# Patient Record
Sex: FEMALE | Race: WHITE | Employment: UNEMPLOYED | URBAN - NONMETROPOLITAN AREA
[De-identification: names, ages, dates, MRNs, and addresses within clinical notes are randomized per-mention and may not be internally consistent; named-entity substitution may affect disease eponyms.]

---

## 2020-11-12 ENCOUNTER — APPOINTMENT (OUTPATIENT)
Dept: GENERAL RADIOLOGY | Age: 60
DRG: 918 | End: 2020-11-12
Payer: COMMERCIAL

## 2020-11-12 ENCOUNTER — HOSPITAL ENCOUNTER (INPATIENT)
Age: 60
LOS: 1 days | Discharge: PSYCHIATRIC OR SUBSTANCE USE FACILITY | DRG: 918 | End: 2020-11-13
Attending: EMERGENCY MEDICINE | Admitting: INTERNAL MEDICINE
Payer: COMMERCIAL

## 2020-11-12 PROBLEM — T50.902A DRUG OVERDOSES, INTENTIONAL SELF-HARM, INITIAL ENCOUNTER (HCC): Status: ACTIVE | Noted: 2020-11-12

## 2020-11-12 LAB
ACETAMINOPHEN LEVEL: <15 UG/ML
ACETAMINOPHEN LEVEL: <15 UG/ML
ALBUMIN SERPL-MCNC: 4.1 G/DL (ref 3.5–5.2)
ALP BLD-CCNC: 83 U/L (ref 35–104)
ALT SERPL-CCNC: 34 U/L (ref 5–33)
AMPHETAMINE SCREEN, URINE: NEGATIVE
ANION GAP SERPL CALCULATED.3IONS-SCNC: 11 MMOL/L (ref 7–19)
AST SERPL-CCNC: 44 U/L (ref 5–32)
BARBITURATE SCREEN URINE: NEGATIVE
BASE EXCESS ARTERIAL: -5.2 MMOL/L (ref -2–2)
BASOPHILS ABSOLUTE: 0.1 K/UL (ref 0–0.2)
BASOPHILS RELATIVE PERCENT: 0.8 % (ref 0–1)
BENZODIAZEPINE SCREEN, URINE: NEGATIVE
BILIRUB SERPL-MCNC: 0.6 MG/DL (ref 0.2–1.2)
BILIRUBIN URINE: NEGATIVE
BLOOD, URINE: NEGATIVE
BUN BLDV-MCNC: 6 MG/DL (ref 8–23)
CALCIUM SERPL-MCNC: 8.6 MG/DL (ref 8.8–10.2)
CANNABINOID SCREEN URINE: NEGATIVE
CARBOXYHEMOGLOBIN ARTERIAL: 2.2 % (ref 0–5)
CHLORIDE BLD-SCNC: 107 MMOL/L (ref 98–111)
CLARITY: CLEAR
CO2: 22 MMOL/L (ref 22–29)
COCAINE METABOLITE SCREEN URINE: NEGATIVE
COLOR: YELLOW
CREAT SERPL-MCNC: 0.6 MG/DL (ref 0.5–0.9)
EOSINOPHILS ABSOLUTE: 0.1 K/UL (ref 0–0.6)
EOSINOPHILS RELATIVE PERCENT: 2.3 % (ref 0–5)
ETHANOL: 176 MG/DL (ref 0–0.08)
GFR AFRICAN AMERICAN: >59
GFR NON-AFRICAN AMERICAN: >60
GLUCOSE BLD-MCNC: 101 MG/DL (ref 74–109)
GLUCOSE URINE: NEGATIVE MG/DL
HCO3 ARTERIAL: 19.1 MMOL/L (ref 22–26)
HCT VFR BLD CALC: 41.8 % (ref 37–47)
HEMOGLOBIN, ART, EXTENDED: 15.3 G/DL (ref 12–16)
HEMOGLOBIN: 14.3 G/DL (ref 12–16)
IMMATURE GRANULOCYTES #: 0 K/UL
INR BLD: 1.01 (ref 0.88–1.18)
KETONES, URINE: NEGATIVE MG/DL
LEUKOCYTE ESTERASE, URINE: NEGATIVE
LIPASE: 30 U/L (ref 13–60)
LYMPHOCYTES ABSOLUTE: 1.4 K/UL (ref 1.1–4.5)
LYMPHOCYTES RELATIVE PERCENT: 23.4 % (ref 20–40)
Lab: NORMAL
MCH RBC QN AUTO: 35.7 PG (ref 27–31)
MCHC RBC AUTO-ENTMCNC: 34.2 G/DL (ref 33–37)
MCV RBC AUTO: 104.2 FL (ref 81–99)
METHEMOGLOBIN ARTERIAL: 1.1 %
MONOCYTES ABSOLUTE: 0.8 K/UL (ref 0–0.9)
MONOCYTES RELATIVE PERCENT: 12.4 % (ref 0–10)
NEUTROPHILS ABSOLUTE: 3.7 K/UL (ref 1.5–7.5)
NEUTROPHILS RELATIVE PERCENT: 60.6 % (ref 50–65)
NITRITE, URINE: NEGATIVE
O2 CONTENT ARTERIAL: 19.3 ML/DL
O2 SAT, ARTERIAL: 90 %
O2 THERAPY: ABNORMAL
OPIATE SCREEN URINE: NEGATIVE
PCO2 ARTERIAL: 33 MMHG (ref 35–45)
PDW BLD-RTO: 13.2 % (ref 11.5–14.5)
PH ARTERIAL: 7.37 (ref 7.35–7.45)
PH UA: 5.5 (ref 5–8)
PLATELET # BLD: 237 K/UL (ref 130–400)
PMV BLD AUTO: 10.2 FL (ref 9.4–12.3)
PO2 ARTERIAL: 62 MMHG (ref 80–100)
POTASSIUM SERPL-SCNC: 3.9 MMOL/L (ref 3.5–5)
POTASSIUM, WHOLE BLOOD: 3.8
PROTEIN UA: NEGATIVE MG/DL
PROTHROMBIN TIME: 13.2 SEC (ref 12–14.6)
RBC # BLD: 4.01 M/UL (ref 4.2–5.4)
SALICYLATE, SERUM: <3 MG/DL (ref 3–10)
SARS-COV-2, NAAT: NOT DETECTED
SODIUM BLD-SCNC: 140 MMOL/L (ref 136–145)
SPECIFIC GRAVITY UA: 1 (ref 1–1.03)
TOTAL PROTEIN: 6.9 G/DL (ref 6.6–8.7)
UROBILINOGEN, URINE: 0.2 E.U./DL
WBC # BLD: 6.1 K/UL (ref 4.8–10.8)

## 2020-11-12 PROCEDURE — U0002 COVID-19 LAB TEST NON-CDC: HCPCS

## 2020-11-12 PROCEDURE — 81003 URINALYSIS AUTO W/O SCOPE: CPT

## 2020-11-12 PROCEDURE — 85025 COMPLETE CBC W/AUTO DIFF WBC: CPT

## 2020-11-12 PROCEDURE — 93005 ELECTROCARDIOGRAM TRACING: CPT

## 2020-11-12 PROCEDURE — 83690 ASSAY OF LIPASE: CPT

## 2020-11-12 PROCEDURE — 36600 WITHDRAWAL OF ARTERIAL BLOOD: CPT

## 2020-11-12 PROCEDURE — 2580000003 HC RX 258: Performed by: EMERGENCY MEDICINE

## 2020-11-12 PROCEDURE — 2000000000 HC ICU R&B

## 2020-11-12 PROCEDURE — G0480 DRUG TEST DEF 1-7 CLASSES: HCPCS

## 2020-11-12 PROCEDURE — G0378 HOSPITAL OBSERVATION PER HR: HCPCS

## 2020-11-12 PROCEDURE — 36415 COLL VENOUS BLD VENIPUNCTURE: CPT

## 2020-11-12 PROCEDURE — 85610 PROTHROMBIN TIME: CPT

## 2020-11-12 PROCEDURE — 6360000002 HC RX W HCPCS: Performed by: INTERNAL MEDICINE

## 2020-11-12 PROCEDURE — 6360000002 HC RX W HCPCS: Performed by: EMERGENCY MEDICINE

## 2020-11-12 PROCEDURE — 99283 EMERGENCY DEPT VISIT LOW MDM: CPT

## 2020-11-12 PROCEDURE — 96374 THER/PROPH/DIAG INJ IV PUSH: CPT

## 2020-11-12 PROCEDURE — 80307 DRUG TEST PRSMV CHEM ANLYZR: CPT

## 2020-11-12 PROCEDURE — 2580000003 HC RX 258: Performed by: INTERNAL MEDICINE

## 2020-11-12 PROCEDURE — 71045 X-RAY EXAM CHEST 1 VIEW: CPT

## 2020-11-12 PROCEDURE — 80053 COMPREHEN METABOLIC PANEL: CPT

## 2020-11-12 PROCEDURE — 99999 PR OFFICE/OUTPT VISIT,PROCEDURE ONLY: CPT | Performed by: EMERGENCY MEDICINE

## 2020-11-12 PROCEDURE — 82803 BLOOD GASES ANY COMBINATION: CPT

## 2020-11-12 PROCEDURE — 84132 ASSAY OF SERUM POTASSIUM: CPT

## 2020-11-12 PROCEDURE — 96372 THER/PROPH/DIAG INJ SC/IM: CPT

## 2020-11-12 PROCEDURE — 96361 HYDRATE IV INFUSION ADD-ON: CPT

## 2020-11-12 RX ORDER — ONDANSETRON 2 MG/ML
4 INJECTION INTRAMUSCULAR; INTRAVENOUS EVERY 6 HOURS PRN
Status: DISCONTINUED | OUTPATIENT
Start: 2020-11-12 | End: 2020-11-13 | Stop reason: HOSPADM

## 2020-11-12 RX ORDER — FLUOXETINE 10 MG/1
10 TABLET, FILM COATED ORAL NIGHTLY
Status: ON HOLD | COMMUNITY
End: 2020-11-16 | Stop reason: HOSPADM

## 2020-11-12 RX ORDER — THIAMINE HYDROCHLORIDE 100 MG/ML
100 INJECTION, SOLUTION INTRAMUSCULAR; INTRAVENOUS ONCE
Status: COMPLETED | OUTPATIENT
Start: 2020-11-12 | End: 2020-11-12

## 2020-11-12 RX ORDER — LORAZEPAM 1 MG/1
1 TABLET ORAL EVERY 6 HOURS PRN
Status: ON HOLD | COMMUNITY
End: 2020-11-16 | Stop reason: HOSPADM

## 2020-11-12 RX ORDER — ESTROGEN,CON/M-PROGEST ACET 0.3-1.5MG
1 TABLET ORAL NIGHTLY
COMMUNITY

## 2020-11-12 RX ORDER — PROMETHAZINE HYDROCHLORIDE 12.5 MG/1
12.5 TABLET ORAL EVERY 6 HOURS PRN
Status: DISCONTINUED | OUTPATIENT
Start: 2020-11-12 | End: 2020-11-13 | Stop reason: HOSPADM

## 2020-11-12 RX ORDER — SODIUM CHLORIDE 9 MG/ML
INJECTION, SOLUTION INTRAVENOUS CONTINUOUS
Status: DISCONTINUED | OUTPATIENT
Start: 2020-11-12 | End: 2020-11-13

## 2020-11-12 RX ORDER — SODIUM CHLORIDE, SODIUM LACTATE, POTASSIUM CHLORIDE, CALCIUM CHLORIDE 600; 310; 30; 20 MG/100ML; MG/100ML; MG/100ML; MG/100ML
1000 INJECTION, SOLUTION INTRAVENOUS ONCE
Status: COMPLETED | OUTPATIENT
Start: 2020-11-12 | End: 2020-11-13

## 2020-11-12 RX ADMIN — SODIUM CHLORIDE: 9 INJECTION, SOLUTION INTRAVENOUS at 16:26

## 2020-11-12 RX ADMIN — THIAMINE HYDROCHLORIDE 100 MG: 100 INJECTION, SOLUTION INTRAMUSCULAR; INTRAVENOUS at 13:43

## 2020-11-12 RX ADMIN — ENOXAPARIN SODIUM 40 MG: 40 INJECTION SUBCUTANEOUS at 16:25

## 2020-11-12 RX ADMIN — SODIUM CHLORIDE, POTASSIUM CHLORIDE, SODIUM LACTATE AND CALCIUM CHLORIDE 1000 ML: 600; 310; 30; 20 INJECTION, SOLUTION INTRAVENOUS at 13:43

## 2020-11-12 SDOH — HEALTH STABILITY: MENTAL HEALTH: HOW OFTEN DO YOU HAVE A DRINK CONTAINING ALCOHOL?: 4 OR MORE TIMES A WEEK

## 2020-11-12 ASSESSMENT — PAIN SCALES - GENERAL
PAINLEVEL_OUTOF10: 0

## 2020-11-12 NOTE — H&P
Hospital Medicine H&P    Patient:  Anastasio Burkitt  MRN: [id number]    Consulting Physician: Mya Zamarripa MD  Reason for Consult: Medical Management  Primacy Care Physician: No primary care provider on file. HISTORY OF PRESENT ILLNESS:   The patient is a 62 y.o. female presents after ingesting a large amount of Ambien and alcohol with the intent of harming herself. Upon my assessment, she will respond to sternal rub. BP and O2 saturation are stable. Past Medical History:        Diagnosis Date    Alcohol abuse        Past Surgical History:        Procedure Laterality Date    LEG SURGERY Left        Medications: Scheduled Meds:  Continuous Infusions:  PRN Meds:. Allergies:  Morphine and Pcn [penicillins]    Social History:   TOBACCO:   has no history on file for tobacco.  ETOH:   reports current alcohol use. Family History:   History reviewed. No pertinent family history. ROS:  Review of Systems   Unable to perform ROS: Mental status change       Physical Exam:    Vitals: BP 97/65   Pulse 81   Temp 97.8 °F (36.6 °C)   Resp 18   SpO2 98%     Physical Exam  Vitals signs and nursing note reviewed. HENT:      Head: Normocephalic and atraumatic. Right Ear: External ear normal.      Left Ear: External ear normal.      Nose: Nose normal.      Mouth/Throat:      Mouth: Mucous membranes are moist.   Eyes:      Conjunctiva/sclera: Conjunctivae normal.   Neck:      Musculoskeletal: Neck supple. No neck rigidity or muscular tenderness. Cardiovascular:      Rate and Rhythm: Normal rate and regular rhythm. Heart sounds: Normal heart sounds. Pulmonary:      Effort: Pulmonary effort is normal.      Breath sounds: Normal breath sounds. Abdominal:      General: Abdomen is flat. Palpations: Abdomen is soft. Musculoskeletal: Normal range of motion. Skin:     General: Skin is warm and dry. Neurological:      Mental Status: She is alert. Comments:  Withdraws from pain         CBC: Recent Labs     11/12/20  1239   WBC 6.1   HGB 14.3        BMP:    Recent Labs     11/12/20  1239 11/12/20  1302     --    K 3.9 3.8     --    CO2 22  --    BUN 6*  --    CREATININE 0.6  --    GLUCOSE 101  --      Hepatic:   Recent Labs     11/12/20  1239   AST 44*   ALT 34*   BILITOT 0.6   ALKPHOS 83     Troponin: No results for input(s): TROPONINI in the last 72 hours. BNP: No results for input(s): BNP in the last 72 hours. Lipids: No results for input(s): CHOL, HDL in the last 72 hours. Invalid input(s): LDLCALCU  ABGs:   Lab Results   Component Value Date    PHART 7.370 11/12/2020    PO2ART 62.0 11/12/2020    [id number] 33.0 11/12/2020     INR:   Recent Labs     11/12/20  1239   INR 1.01     -----------------------------------------------------------------  Xr Chest Portable    Result Date: 11/12/2020  Examination. XR CHEST PORTABLE 11/12/2020 11:49 AM History: Overdose. A frontal portable upright view of the chest is obtained. There is no previous study for comparison. The lungs are poorly inflated. There are areas of discoid atelectasis in the lower lung bilaterally. There is no evidence recent infiltrate, pleural effusion, pulmonary congestion or pneumothorax. Heart size is not evaluated. No acute bony abnormality. The poor lung expansion but no active cardiopulmonary disease. Signed by Dr Clement Young on 11/12/2020 1:18 PM         Assessment and Plan     Drug overdoses, intentional self-harm, initial encounter. Admit to ICU. Observe. Psych consult once medically stable. Please document 35 minutes for discussion with ER staff, patient assessment, , and documentation.       Ashley Dumont DO

## 2020-11-12 NOTE — PROGRESS NOTES
Contains abnormal data  BLOOD GAS, ARTERIAL   Status:  Final result    Ref Range & Units  11/12/20 1302   pH, Arterial  7.350 - 7.450  7.370    pCO2, Arterial  35.0 - 45.0 mmHg  33. 0Low      pO2, Arterial  80.0 - 100.0 mmHg  62. 0Low      HCO3, Arterial  22.0 - 26.0 mmol/L  19.1Low      Base Excess, Arterial  -2.0 - 2.0 mmol/L  -5.2Low      Hemoglobin, Art, Extended  12.0 - 16.0 g/dL  15.3    O2 Sat, Arterial  >92 %  90.0    Carboxyhgb, Arterial  0.0 - 5.0 %  2.2    Comment:      0.0-1.5   (Smokers 1.5-5.0)    Methemoglobin, Arterial  <1.5 %  1.1    O2 Content, Arterial  Not Established mL/dL  19.3    O2 Therapy   Unknown    Resulting Agency   [facility]         Specimen Collected: 11/12/20 1302  Last Resulted: 11/12/20 1303  View Other Order Details         Pt on room air, RR 20 site RR (choice)

## 2020-11-12 NOTE — ED NOTES
Called poison control, spoke to Floretta Hammans. Watch for CNS depression, supportive care, observe 4-6 hours.       [address] [place] [address], RN  11/12/20 [address] 15 RAMO Tanner  11/12/20 [address] 15 Keo Bettencourt  11/12/20 5061

## 2020-11-12 NOTE — ED NOTES
Spoke to pt sister, Blue Jeffery, on the phone ([phone]) Per pt sister, pt called her at [address] [place] [address] and said she was \"done, cant do life anymore, going to take pills\" and that pt chose now to do it bc sister is in the hospital and cant stop her. Per pt sister, pt also takes ativan 1mg and is unsure if pt took any today. Pt also is an alcoholic per sister.       Wesley Jay RN  11/12/20 4245

## 2020-11-12 NOTE — ED PROVIDER NOTES
History     Socioeconomic History    Marital status: Unknown     Spouse name: None    Number of children: None    Years of education: None    Highest education level: None   Occupational History    None   Social Needs    Financial resource strain: None    Food insecurity     Worry: None     Inability: None    Transportation needs     Medical: None     Non-medical: None   Tobacco Use    Smoking status: Unknown If Ever Smoked   Substance and Sexual Activity    Alcohol use: Yes    Drug use: None    Sexual activity: None   Lifestyle    Physical activity     Days per week: None     Minutes per session: None    Stress: None   Relationships    Social connections     Talks on phone: None     Gets together: None     Attends Yarsani service: None     Active member of club or organization: None     Attends meetings of clubs or organizations: None     Relationship status: None    Intimate partner violence     Fear of current or ex partner: None     Emotionally abused: None     Physically abused: None     Forced sexual activity: None   Other Topics Concern    None   Social History Narrative    None       SCREENINGS             PHYSICAL EXAM    (up to 7 for level 4, 8 or more for level 5)     ED Triage Vitals [11/12/20 1239]   BP Temp Temp src Pulse Resp SpO2 Height Weight   (!) 126/96 97.8 °F (36.6 °C) -- 111 18 98 % -- --       Physical Exam  Vitals signs and nursing note reviewed. Constitutional:       Appearance: She is ill-appearing. Comments: Patient will wake up and answer some questions. She slurs her speech she goes back to sleep. She does not appear in any distress however she is lethargic post overdose. HENT:      Head: Normocephalic and atraumatic. Eyes:      Extraocular Movements: Extraocular movements intact. Pupils: Pupils are equal, round, and reactive to light. Neck:      Musculoskeletal: Normal range of motion and neck supple.    Cardiovascular:      Rate and Rhythm: Normal rate and regular rhythm. Pulmonary:      Effort: Pulmonary effort is normal. No respiratory distress. Abdominal:      General: Abdomen is flat. Tenderness: There is no abdominal tenderness. Musculoskeletal: Normal range of motion. General: No tenderness. Skin:     General: Skin is warm and dry. Neurological:      Mental Status: She is lethargic. GCS: GCS eye subscore is 4. GCS verbal subscore is 5. GCS motor subscore is 6. Comments: Patient is very sleepy however she is able to answer questions she tells me she is tired         DIAGNOSTIC RESULTS     EKG: All EKG's are interpreted by the Emergency Department Physician who either signs or Co-signs this chart in the absence of a cardiologist.    EKG sinus tach 101 rate. QTc 450 QRS 77. Some artifact. No old EKG for comparison. No widening of the QRS. In this overdose patient. RADIOLOGY:   Non-plain film images such as CT, Ultrasound and MRI are read by the radiologist. Elio Snyder images are visualized and preliminarily interpreted by the emergency physician with the below findings:        Interpretation per the Radiologist below, if available at the time of this note:    XR CHEST PORTABLE   Final Result   The poor lung expansion but no active cardiopulmonary   disease.    Signed by Dr Olympia Lesches on 11/12/2020 1:18 PM            ED BEDSIDE ULTRASOUND:   Performed by ED Physician - none    LABS:  Labs Reviewed   COMPREHENSIVE METABOLIC PANEL - Abnormal; Notable for the following components:       Result Value    BUN 6 (*)     Calcium 8.6 (*)     ALT 34 (*)     AST 44 (*)     All other components within normal limits   CBC WITH AUTO DIFFERENTIAL - Abnormal; Notable for the following components:    RBC 4.01 (*)     .2 (*)     MCH 35.7 (*)     Monocytes % 12.4 (*)     All other components within normal limits   BLOOD GAS, ARTERIAL - Abnormal; Notable for the following components:    pCO2, Arterial 33.0 (*)     pO2, Arterial 62.0 (*)     HCO3, Arterial 19.1 (*)     Base Excess, Arterial -5.2 (*)     All other components within normal limits   LIPASE   PROTIME-INR   URINE RT REFLEX TO CULTURE   URINE DRUG SCREEN   ETHANOL   SALICYLATE LEVEL   ACETAMINOPHEN LEVEL   POTASSIUM, WHOLE BLOOD   COVID-19       All other labs were within normal range or not returned as of this dictation. EMERGENCY DEPARTMENT COURSE and DIFFERENTIALDIAGNOSIS/MDM:   Vitals:    Vitals:    11/12/20 1239 11/12/20 1304   BP: (!) 126/96    Pulse: 111    Resp: 18 16   Temp: 97.8 °F (36.6 °C)    SpO2: 98%        MDM  Number of Diagnoses or Management Options  Acute alcoholic intoxication with complication [facility]): new and requires workup  Intentional drug overdose, initial encounter [facility]): new and requires workup  Suicide attempt [facility]): new and requires workup  Diagnosis management comments: Patient with suicide attempt by polysubstance overdose including benzos and Ambien. Patient also drank alcohol. She was lethargic and somnolent on arrival.  Unclear exactly the timing of all this. The patient still is rather lethargic. She has woken up a little bit. She can talk. She does not require intubation. ABG showed sats in the low 90s. She was put on 2 L oxygen. Patient had a cath urine sent. Otherwise she will need a psychiatric evaluation medically stable. She needs to be monitored really throughout the day until tomorrow. Given her lethargy multisubstance overdose and not good history. I do think she requires ICU level care at this time for continued evaluation and monitoring. Patient discussed with Dr. Too Roger who will admit the patient. Have ordered a Covid screening test as well. The patient has no fever or Covid symptoms however.        Amount and/or Complexity of Data Reviewed  Clinical lab tests: ordered and reviewed  Tests in the radiology section of CPT®: ordered and reviewed  Obtain history from someone other than the patient: yes  Independent visualization of images, tracings, or specimens: yes    Risk of Complications, Morbidity, and/or Mortality  Presenting problems: high    Patient Progress  Patient progress: (guarded)        CONSULTS:  None    PROCEDURES:  Unless otherwise notedbelow, none     Procedures    FINAL IMPRESSION     1. Intentional drug overdose, initial encounter ([facility])    2. Acute alcoholic intoxication with complication ([facility])    3. Suicide attempt ([facility])          DISPOSITION/PLAN   DISPOSITION        PATIENT REFERRED TO:  No follow-up provider specified.     DISCHARGE MEDICATIONS:  New Prescriptions    No medications on file          (Please note that portions of this note were completed with a voice recognition program.  Efforts were made to edit the dictations butoccasionally words are mis-transcribed.)    Shannon Perez MD (electronically signed)  AttendingEmergency Physician         Shannon Perez MD  11/12/20 0301

## 2020-11-12 NOTE — ED NOTES
Bed: 01-A  Expected date: 11/12/20  Expected time:   Means of arrival:   Comments:  [address] [facility] [address] EKATERINA Alcantara, RN  11/12/20 3379

## 2020-11-13 ENCOUNTER — HOSPITAL ENCOUNTER (INPATIENT)
Age: 60
LOS: 3 days | Discharge: HOME | DRG: 885 | End: 2020-11-16
Attending: PSYCHIATRY & NEUROLOGY | Admitting: PSYCHIATRY & NEUROLOGY
Payer: COMMERCIAL

## 2020-11-13 VITALS
TEMPERATURE: 98.1 F | HEIGHT: 62 IN | SYSTOLIC BLOOD PRESSURE: 133 MMHG | DIASTOLIC BLOOD PRESSURE: 83 MMHG | BODY MASS INDEX: 29.81 KG/M2 | RESPIRATION RATE: 19 BRPM | OXYGEN SATURATION: 96 % | HEART RATE: 92 BPM | WEIGHT: 162 LBS

## 2020-11-13 PROBLEM — T50.902A DRUG OVERDOSES, INTENTIONAL SELF-HARM, INITIAL ENCOUNTER (HCC): Status: RESOLVED | Noted: 2020-11-12 | Resolved: 2020-11-13

## 2020-11-13 LAB
ALBUMIN SERPL-MCNC: 3.8 G/DL (ref 3.5–5.2)
ALP BLD-CCNC: 75 U/L (ref 35–104)
ALT SERPL-CCNC: 34 U/L (ref 5–33)
ANION GAP SERPL CALCULATED.3IONS-SCNC: 9 MMOL/L (ref 7–19)
AST SERPL-CCNC: 52 U/L (ref 5–32)
BASOPHILS ABSOLUTE: 0.1 K/UL (ref 0–0.2)
BASOPHILS RELATIVE PERCENT: 1.4 % (ref 0–1)
BILIRUB SERPL-MCNC: 0.7 MG/DL (ref 0.2–1.2)
BUN BLDV-MCNC: 7 MG/DL (ref 8–23)
CALCIUM SERPL-MCNC: 8.3 MG/DL (ref 8.8–10.2)
CHLORIDE BLD-SCNC: 113 MMOL/L (ref 98–111)
CO2: 23 MMOL/L (ref 22–29)
CREAT SERPL-MCNC: 0.5 MG/DL (ref 0.5–0.9)
EOSINOPHILS ABSOLUTE: 0.2 K/UL (ref 0–0.6)
EOSINOPHILS RELATIVE PERCENT: 3.2 % (ref 0–5)
GFR AFRICAN AMERICAN: >59
GFR NON-AFRICAN AMERICAN: >60
GLUCOSE BLD-MCNC: 85 MG/DL (ref 74–109)
HCT VFR BLD CALC: 39.1 % (ref 37–47)
HEMOGLOBIN: 13.5 G/DL (ref 12–16)
IMMATURE GRANULOCYTES #: 0 K/UL
LYMPHOCYTES ABSOLUTE: 1.3 K/UL (ref 1.1–4.5)
LYMPHOCYTES RELATIVE PERCENT: 25 % (ref 20–40)
MCH RBC QN AUTO: 35.9 PG (ref 27–31)
MCHC RBC AUTO-ENTMCNC: 34.5 G/DL (ref 33–37)
MCV RBC AUTO: 104 FL (ref 81–99)
MONOCYTES ABSOLUTE: 0.7 K/UL (ref 0–0.9)
MONOCYTES RELATIVE PERCENT: 12.9 % (ref 0–10)
NEUTROPHILS ABSOLUTE: 2.9 K/UL (ref 1.5–7.5)
NEUTROPHILS RELATIVE PERCENT: 57.3 % (ref 50–65)
PDW BLD-RTO: 13.4 % (ref 11.5–14.5)
PLATELET # BLD: 198 K/UL (ref 130–400)
PMV BLD AUTO: 9.8 FL (ref 9.4–12.3)
POTASSIUM REFLEX MAGNESIUM: 3.7 MMOL/L (ref 3.5–5)
RBC # BLD: 3.76 M/UL (ref 4.2–5.4)
SODIUM BLD-SCNC: 145 MMOL/L (ref 136–145)
TOTAL PROTEIN: 6 G/DL (ref 6.6–8.7)
WBC # BLD: 5.1 K/UL (ref 4.8–10.8)

## 2020-11-13 PROCEDURE — 1240000000 HC EMOTIONAL WELLNESS R&B

## 2020-11-13 PROCEDURE — G0378 HOSPITAL OBSERVATION PER HR: HCPCS

## 2020-11-13 PROCEDURE — 99221 1ST HOSP IP/OBS SF/LOW 40: CPT | Performed by: PSYCHIATRY & NEUROLOGY

## 2020-11-13 PROCEDURE — 36415 COLL VENOUS BLD VENIPUNCTURE: CPT

## 2020-11-13 PROCEDURE — 85025 COMPLETE CBC W/AUTO DIFF WBC: CPT

## 2020-11-13 PROCEDURE — 6370000000 HC RX 637 (ALT 250 FOR IP): Performed by: PSYCHIATRY & NEUROLOGY

## 2020-11-13 PROCEDURE — 80053 COMPREHEN METABOLIC PANEL: CPT

## 2020-11-13 RX ORDER — HYDROXYZINE HYDROCHLORIDE 25 MG/1
25 TABLET, FILM COATED ORAL 3 TIMES DAILY PRN
Status: DISCONTINUED | OUTPATIENT
Start: 2020-11-13 | End: 2020-11-16 | Stop reason: HOSPADM

## 2020-11-13 RX ORDER — TRAZODONE HYDROCHLORIDE 50 MG/1
25 TABLET ORAL NIGHTLY
Status: DISCONTINUED | OUTPATIENT
Start: 2020-11-13 | End: 2020-11-16 | Stop reason: HOSPADM

## 2020-11-13 RX ORDER — POLYETHYLENE GLYCOL 3350 17 G/17G
17 POWDER, FOR SOLUTION ORAL DAILY PRN
Status: DISCONTINUED | OUTPATIENT
Start: 2020-11-13 | End: 2020-11-16 | Stop reason: HOSPADM

## 2020-11-13 RX ORDER — ACETAMINOPHEN 325 MG/1
650 TABLET ORAL EVERY 4 HOURS PRN
Status: DISCONTINUED | OUTPATIENT
Start: 2020-11-13 | End: 2020-11-16 | Stop reason: HOSPADM

## 2020-11-13 RX ORDER — ONDANSETRON 2 MG/ML
4 INJECTION INTRAMUSCULAR; INTRAVENOUS EVERY 6 HOURS PRN
Status: CANCELLED | OUTPATIENT
Start: 2020-11-13

## 2020-11-13 RX ORDER — LANOLIN ALCOHOL/MO/W.PET/CERES
3 CREAM (GRAM) TOPICAL NIGHTLY PRN
Status: DISCONTINUED | OUTPATIENT
Start: 2020-11-13 | End: 2020-11-16 | Stop reason: HOSPADM

## 2020-11-13 RX ORDER — PROMETHAZINE HYDROCHLORIDE 12.5 MG/1
12.5 TABLET ORAL EVERY 6 HOURS PRN
Status: CANCELLED | OUTPATIENT
Start: 2020-11-13

## 2020-11-13 RX ADMIN — Medication 3 MG: at 20:40

## 2020-11-13 RX ADMIN — TRAZODONE HYDROCHLORIDE 25 MG: 50 TABLET ORAL at 20:40

## 2020-11-13 RX ADMIN — HYDROXYZINE HYDROCHLORIDE 25 MG: 25 TABLET, FILM COATED ORAL at 20:40

## 2020-11-13 ASSESSMENT — PAIN SCALES - GENERAL
PAINLEVEL_OUTOF10: 0

## 2020-11-13 ASSESSMENT — ENCOUNTER SYMPTOMS
DIARRHEA: 0
VOMITING: 0
GASTROINTESTINAL NEGATIVE: 1
RHINORRHEA: 0
CONSTIPATION: 0
VOICE CHANGE: 0
NAUSEA: 0
RESPIRATORY NEGATIVE: 1
COUGH: 0
COLOR CHANGE: 0
SHORTNESS OF BREATH: 0
BACK PAIN: 0

## 2020-11-13 ASSESSMENT — PATIENT HEALTH QUESTIONNAIRE - PHQ9: SUM OF ALL RESPONSES TO PHQ QUESTIONS 1-9: 9

## 2020-11-13 ASSESSMENT — LIFESTYLE VARIABLES: HISTORY_ALCOHOL_USE: YES

## 2020-11-13 NOTE — DISCHARGE SUMMARY
Discharge Summary    Katharina Hamilton  :  1960  MRN:  [id number]    Admit date:  2020  Discharge date: 2020     Admitting Physician:  Bari Quiroga DO    Advance Directive: Full Code    Consults: psychiatry    Primary Care Physician:  No primary care provider on file. Discharge Diagnoses: Active Problems:    * No active hospital problems. *  Resolved Problems:    Drug overdoses, intentional self-harm, initial encounter [facility])      Significant Diagnostic Studies:   Xr Chest Portable    Result Date: 2020  Examination. XR CHEST PORTABLE 2020 11:49 AM History: Overdose. A frontal portable upright view of the chest is obtained. There is no previous study for comparison. The lungs are poorly inflated. There are areas of discoid atelectasis in the lower lung bilaterally. There is no evidence recent infiltrate, pleural effusion, pulmonary congestion or pneumothorax. Heart size is not evaluated. No acute bony abnormality. The poor lung expansion but no active cardiopulmonary disease. Signed by Dr Tyrese Chapman on 2020 1:18 PM      CBC:   Recent Labs     20  1239 20  0404   WBC 6.1 5.1   HGB 14.3 13.5    198     BMP:    Recent Labs     20  1239 20  1302 20  0404     --  145   K 3.9 3.8 3.7     --  113*   CO2 22  --  23   BUN 6*  --  7*   CREATININE 0.6  --  0.5   GLUCOSE 101  --  85     INR:   Recent Labs     20  1239   INR 1.01     Lipids: No results for input(s): CHOL, HDL in the last 72 hours. Invalid input(s): LDLCALCU  ABGs:  Recent Labs     20  1302   PHART 7.370   BCX6LFM 33.0*   PO2ART 62.0*   [id number] 19.1*   BEART -5.2*   HGBAE 15.3   [id number] 90.0   CARBOXHGBART 2.2   02THERAPY Unknown     HgBA1c:  No results for input(s): LABA1C in the last 72 hours. Procedures: None  Hospital Course: Ms. Marylene Setting was admitted on  after polysubstance overdose. She was monitored in the ICU.   She did have suicidal intentions. She improved clinically. Consultation was obtained with psychiatry on 11/13 who felt she would benefit from from inpatient psychiatric therapy. She will be transferred this afternoon. Physical Exam:  Vital Signs: /83   Pulse 92   Temp 98.1 °F (36.7 °C) (Temporal)   Resp 19   Ht 5' 2\" (1.575 m)   Wt 162 lb (73.5 kg)   SpO2 96%   BMI 29.63 kg/m²   General appearance:. Alert and Cooperative   HEENT: Normocephalic. Chest: clear to auscultation bilaterally without wheezes or rhonchi. Cardiac: Normal heart tones with regular rate and rhythm, S1, S2 normal. No murmurs, gallops, or rubs auscultated. Abdomen:soft, non-tender; normal bowel sounds, no masses, no organomegaly. Extremities: No clubbing or cyanosis. No peripheral edema. Peripheral pulses palpable. Neurologic: Grossly intact. Discharge Medications:       Alannah Navarro   Home Medication Instructions FDV:[id number]    Printed on:11/13/20 6075   Medication Information                      estrogen, conjugated,-medroxyPROGESTERone (PREMPRO) 0.3-1.5 MG per tablet  Take 1 tablet by mouth nightly             FLUoxetine (PROZAC) 10 MG tablet  Take 10 mg by mouth nightly             LORazepam (ATIVAN) 1 MG tablet  Take 1 mg by mouth every 6 hours as needed for Anxiety. Discharge Instructions: Follow up with PCP in 3-5 days. Take medications as directed. Resume activity as tolerated. Diet: DIET GENERAL; Safety Tray; Safety Tray (Disposables)     Disposition: Patient is medically stable and will be transferred to inpatient psychiatry. Time spent on discharge greater than 30 minutes.     Signed:  Aline Grijalva DO

## 2020-11-13 NOTE — H&P
file     Relationship status: Not on file    Intimate partner violence     Fear of current or ex partner: Not on file     Emotionally abused: Not on file     Physically abused: Not on file     Forced sexual activity: Not on file   Other Topics Concern    Not on file   Social History Narrative    Not on file       Prior to Admission medications    Medication Sig Start Date End Date Taking? Authorizing Provider   LORazepam (ATIVAN) 1 MG tablet Take 1 mg by mouth every 6 hours as needed for Anxiety. Historical Provider, MD   estrogen, conjugated,-medroxyPROGESTERone (PREMPRO) 0.3-1.5 MG per tablet Take 1 tablet by mouth nightly    Historical Provider, MD   FLUoxetine (PROZAC) 10 MG tablet Take 10 mg by mouth nightly    Historical Provider, MD         Current Facility-Administered Medications:     acetaminophen (TYLENOL) tablet 650 mg, 650 mg, Oral, Q4H PRN, Bob Murillo MD    polyethylene glycol (GLYCOLAX) packet 17 g, 17 g, Oral, Daily PRN, Bob Murillo MD    traZODone (DESYREL) tablet 25 mg, 25 mg, Oral, Nightly, Bob Murillo MD    hydrOXYzine (ATARAX) tablet 25 mg, 25 mg, Oral, TID PRN, Bob Murillo MD    melatonin tablet 3 mg, 3 mg, Oral, Nightly PRN, Bob Murillo MD    Morphine and Pcn [penicillins]    REVIEW OF SYSTEMS:    Review of Systems   Constitutional: Negative. HENT: Negative. Respiratory: Negative. Cardiovascular: Negative. Gastrointestinal: Negative. Genitourinary: Negative. Musculoskeletal: Negative. Skin: Negative. Neurological: Negative. Psychiatric/Behavioral: Positive for self-injury. Depression       PHYSICAL EXAM:    BP (!) 154/97   Pulse 94   Temp 97.6 °F (36.4 °C) (Temporal)   Resp 18   Ht 5' 3\" (1.6 m)   Wt 161 lb 2 oz (73.1 kg)   SpO2 94%   BMI 28.54 kg/m²     Physical Exam  Vitals signs reviewed. Constitutional:       Appearance: Normal appearance. HENT:      Head: Normocephalic and atraumatic.       Mouth/Throat: Mouth: Mucous membranes are moist.      Pharynx: Oropharynx is clear. Eyes:      Extraocular Movements: Extraocular movements intact. Conjunctiva/sclera: Conjunctivae normal.      Pupils: Pupils are equal, round, and reactive to light. Neck:      Musculoskeletal: Normal range of motion and neck supple. Cardiovascular:      Rate and Rhythm: Normal rate and regular rhythm. Pulses: Normal pulses. Heart sounds: Normal heart sounds. Pulmonary:      Effort: Pulmonary effort is normal. No respiratory distress. Breath sounds: Normal breath sounds. Abdominal:      General: Abdomen is flat. Bowel sounds are normal.      Palpations: Abdomen is soft. Musculoskeletal: Normal range of motion. Skin:     General: Skin is warm and dry. Capillary Refill: Capillary refill takes less than 2 seconds. Neurological:      General: No focal deficit present. Mental Status: She is alert and oriented to person, place, and time. Cranial Nerves: No cranial nerve deficit. Psychiatric:         Mood and Affect: Mood normal.         Behavior: Behavior normal.         Thought Content:  Thought content normal.         Judgment: Judgment normal.         Recent Results (from the past 24 hour(s))   CBC auto differential    Collection Time: 11/13/20  4:04 AM   Result Value Ref Range    WBC 5.1 4.8 - 10.8 K/uL    RBC 3.76 (L) 4.20 - 5.40 M/uL    Hemoglobin 13.5 12.0 - 16.0 g/dL    Hematocrit 39.1 37.0 - 47.0 %    .0 (H) 81.0 - 99.0 fL    MCH 35.9 (H) 27.0 - 31.0 pg    MCHC 34.5 33.0 - 37.0 g/dL    RDW 13.4 11.5 - 14.5 %    Platelets 074 892 - 656 K/uL    MPV 9.8 9.4 - 12.3 fL    Neutrophils % 57.3 50.0 - 65.0 %    Lymphocytes % 25.0 20.0 - 40.0 %    Monocytes % 12.9 (H) 0.0 - 10.0 %    Eosinophils % 3.2 0.0 - 5.0 %    Basophils % 1.4 (H) 0.0 - 1.0 %    Neutrophils Absolute 2.9 1.5 - 7.5 K/uL    Immature Granulocytes # 0.0 K/uL    Lymphocytes Absolute 1.3 1.1 - 4.5 K/uL    Monocytes Absolute 0.70 0.00 - 0.90 K/uL    Eosinophils Absolute 0.20 0.00 - 0.60 K/uL    Basophils Absolute 0.10 0.00 - 0.20 K/uL   Comprehensive Metabolic Panel w/ Reflex to MG    Collection Time: 11/13/20  4:04 AM   Result Value Ref Range    Sodium 145 136 - 145 mmol/L    Potassium reflex Magnesium 3.7 3.5 - 5.0 mmol/L    Chloride 113 (H) 98 - 111 mmol/L    CO2 23 22 - 29 mmol/L    Anion Gap 9 7 - 19 mmol/L    Glucose 85 74 - 109 mg/dL    BUN 7 (L) 8 - 23 mg/dL    CREATININE 0.5 0.5 - 0.9 mg/dL    GFR Non-African American >60 >60    GFR African American >59 >59    Calcium 8.3 (L) 8.8 - 10.2 mg/dL    Total Protein 6.0 (L) 6.6 - 8.7 g/dL    Alb 3.8 3.5 - 5.2 g/dL    Total Bilirubin 0.7 0.2 - 1.2 mg/dL    Alkaline Phosphatase 75 35 - 104 U/L    ALT 34 (H) 5 - 33 U/L    AST 52 (H) 5 - 32 U/L         ASSESSMENT:  1. Major depressive disorder with suicide attempt    PLAN:    1. She is admitted to [facility]. Lab results and home meds reviewed. VSS. Continue plan of care per psych. Alter treatment plan as needed.         Maki Rodriguez, AARON,   11/13/2020

## 2020-11-13 NOTE — CONSULTS
[facility]  Psychiatry Consult    Reason for Consult: overdose    56-year-old white female with history of depression, alcohol use, no previous suicide attempts or psychiatric hospitalizations, admitted to ICU s/p an overdose on Ambien and alcohol. UDS negative, . Patient has been medically cleared for transfer to psychiatry. Patient is observed eating lunch this afternoon. She is calm and cooperative. She denies withdrawal symptoms. She becomes tearful during the interview. She admits to a suicide attempt. States she recently went through a number of stressors, including losing her job as a , having to sell her house in [facility], coming to [place]" to help her sister move. Patient's mother has history of Alzheimer's dementia and is doing poorly. Her sister had an MVA and is currently at the hospital.  Patient has 2 teenage children who are currently with her ex- in [facility]. Patient states she has been overwhelmed and depressed. She is not sleeping. She denies mood swings and racing thoughts. She denies auditory and visual hallucinations. She denies paranoia. States she has never had suicidal thoughts until recently. Currently, however, appears hopeless. She believes that she will benefit from inpatient psychiatric treatment. Psychiatric History:    Diagnoses: Depression - started after father    Suicide attempts/gestures: Denies   Prior hospitalizations: Denies   Medication trials: Prozac, Luvox  Mental health contact: Lost to follow-up   Head trauma: Denies    Substance Use History:  Drinks alcohol - did not specify amount/frequesncy. Denies withdrawal seizures. Denies illicit drug use. Denies tobacco use.     Allergies:  Morphine and Pcn [penicillins]    Medical History:  Past Medical History:   Diagnosis Date    Alcohol abuse     Asthma     Depression        Family History:  Family History   Problem Relation Age of Onset    Alzheimer's Disease Mother      Sister with h/o depression and suicide attempts. Social History:  Patient is  and has 2 teenage children who are currently in [place] with her ex-. Patient has a house in [place].  She is currently in [place] helping her sister move. Recently lost her job as a . Review of Systems: 14 point review of systems negative except as described above    Vitals:    11/13/20 1200   BP: 131/78   Pulse: 76   Resp: 17   Temp: 98.1 °F (36.7 °C)   SpO2: 94%       Mental Status  Appearance: Appropriately groomed and in hospital attire. Made good eye contact. Behavior: Calm, cooperative, and socially appropriate. Speech: Normal in tone, volume, and quality. No slurring, dysarthria or pressured speech noted. Mood: \"Depressed\"   Affect: Labile  Thought Process: Appears linear  Thought Content: Denies active suicidal and homicidal ideation. No overt delusions or paranoia appreciated. Perceptions: Denies auditory or visual hallucinations at present time. Not responding to internal stimuli. Concentration: Intact. Orientation: to person, place, date, and situation. Language: Intact. Fund of information: Intact. Memory: Recent and remote appear intact. Impulsivity: Questionable. Neurovegitative: Poor appetite and sleep. Insight: Limited. Judgment: Limited. Assessment  DSM-5 DIAGNOSIS:  Impression   Major depressive disorder, recurrent, severe, without psychotic features  Suicide attempt  Alcohol use disorder  Transaminitis    Patient presents with affective instability and hopelessness. She is meeting the criteria for inpatient psychiatric treatment. Please transfer to psychiatry today. Thank you for consulting our service. Please call us with questions.       Anisha Nash MD

## 2020-11-13 NOTE — PROGRESS NOTES
[facility] Admission From ED  Nursing Admission Note              Patient Active Problem List   Diagnosis   (none) - all problems resolved or deleted       Pt admitted from 's care in ICU to [address] room 0619/619-01. Arrived on unit via [facility] with staff escort. Pt appropriately attired in paper scrubs. Body assessment completed by RN with no contraband discovered. All tubes, lines, and drains were appropriately discontinued by ED staff prior to pt transfer to [facility]. Pt belongings and valuables inventoried and cataloged, stored per policy. Pt oriented to surroundings, program expectations, and copy pt rights given. Received admit packet: [address], Visitation Info, Fall Prevention, Restraints Info. Consents reviewed, signed Pt Rights, Handbook Acceptance, Visit/Call Acceptance, PHI Release, Social Info Release, and Treatment Agreement. Pt verbalizes understanding. Pt is a smoker? no Pt offered Nicotine patch yes,   Pt refused Nicotine patch? yes,  Patient provided education on Covid-19 and the importance of reporting any respiratory symptoms, headache, body aches or cough to the nursing staff as well as  frequent hand washing, social distancing and wearing a mask while on the unit? yes,  patient provided mask? yes,  patient refused mask? no Patient verbalized understanding?  Yes.

## 2020-11-13 NOTE — PROGRESS NOTES
Hospitalist Progress Note    Patient:  Gregory Mehta  YOB: 1960  Date of Service: 11/13/2020  MRN: [id number]   Acct: [de-identified]   Primary Care Physician: No primary care provider on file. Advance Directive: Full Code  Admit Date: 11/12/2020       Hospital Day: 1  Referring Provider: Javier Palmer DO    Patient Seen, Chart, Consults, Notes, Labs, Radiology studies reviewed. Subjective:  Gregory Mehta is a 62 y.o. female  whom we are following for polysubstance overdose. She is now awake and conversant. I think she is somewhat delusional.  She tells me that she was the previous  at [employer] and at some other large corporation. She is telling me about large dollar trust funds for her children. She is tearful throughout the interview. Hemodynamics are stable. We will transfer out of ICU and ask psychiatry to see her.     Allergies:  Morphine and Pcn [penicillins]    Medicines:  Current Facility-Administered Medications   Medication Dose Route Frequency Provider Last Rate Last Dose    promethazine (PHENERGAN) tablet 12.5 mg  12.5 mg Oral Q6H PRN Javier Garre, DO        Or    [facility]) injection 4 mg  4 mg Intravenous Q6H PRN Javier Garre, DO        enoxaparin (LOVENOX) injection 40 mg  40 mg Subcutaneous Q24H Javier Garre, DO   40 mg at 11/12/20 1625       Past Medical History:  Past Medical History:   Diagnosis Date    Alcohol abuse     Asthma     Depression        Past Surgical History:  Past Surgical History:   Procedure Laterality Date    LEG SURGERY Left     tibia nailing       Family History  Family History   Problem Relation Age of Onset    Alzheimer's Disease Mother        Social History  Social History     Socioeconomic History    Marital status:      Spouse name: Not on file    Number of children: Not on file    Years of education: Not on file    Highest education level: Not on file   Occupational History dysphoric mood and suicidal ideas. Objective:  Blood pressure (!) 140/84, pulse 102, temperature 96.9 °F (36.1 °C), temperature source Temporal, resp. rate 24, height 5' 2\" (1.575 m), weight 162 lb (73.5 kg), SpO2 94 %. Intake/Output Summary (Last 24 hours) at 11/13/2020 0745  Last data filed at 11/13/2020 0600  Gross per 24 hour   Intake 1017.5 ml   Output 2100 ml   Net -1082.5 ml       Physical Exam  Vitals signs and nursing note reviewed. HENT:      Head: Normocephalic and atraumatic. Right Ear: External ear normal.      Left Ear: External ear normal.      Nose: Nose normal.      Mouth/Throat:      Mouth: Mucous membranes are moist.   Eyes:      Conjunctiva/sclera: Conjunctivae normal.      Pupils: Pupils are equal, round, and reactive to light. Neck:      Musculoskeletal: Neck supple. No neck rigidity or muscular tenderness. Cardiovascular:      Rate and Rhythm: Normal rate and regular rhythm. Heart sounds: Normal heart sounds. Pulmonary:      Effort: Pulmonary effort is normal.      Breath sounds: Normal breath sounds. Abdominal:      General: Abdomen is flat. Palpations: Abdomen is soft. Musculoskeletal: Normal range of motion. Skin:     General: Skin is warm and dry. Neurological:      Mental Status: She is alert and oriented to person, place, and time. Labs:  BMP:   Recent Labs     11/12/20  1239 11/12/20  1302 11/13/20  0404     --  145   K 3.9 3.8 3.7     --  113*   CO2 22  --  23   BUN 6*  --  7*   CREATININE 0.6  --  0.5   CALCIUM 8.6*  --  8.3*     CBC:   Recent Labs     11/12/20  1239 11/13/20  0404   WBC 6.1 5.1   HGB 14.3 13.5   HCT 41.8 39.1   .2* 104.0*    198     LIVER PROFILE:   Recent Labs     11/12/20  1239 11/13/20  0404   AST 44* 52*   ALT 34* 34*   LIPASE 30  --    BILITOT 0.6 0.7   ALKPHOS 83 75     PT/INR:   Recent Labs     11/12/20  1239   PROTIME 13.2   INR 1.01     APTT: No results for input(s):  APTT in the last 72 hours. BNP:  No results for input(s): BNP in the last 72 hours. Ionized Calcium:No results for input(s): IONCA in the last 72 hours. Magnesium:No results for input(s): MG in the last 72 hours. Phosphorus:No results for input(s): PHOS in the last 72 hours. HgbA1C: No results for input(s): LABA1C in the last 72 hours. Hepatic:   Recent Labs     11/12/20  1239 11/13/20  0404   ALKPHOS 83 75   ALT 34* 34*   AST 44* 52*   PROT 6.9 6.0*   BILITOT 0.6 0.7   LABALBU 4.1 3.8     Lactic Acid: No results for input(s): LACTA in the last 72 hours. Troponin: No results for input(s): CKTOTAL, CKMB, TROPONINT in the last 72 hours. ABGs: No results for input(s): PH, PCO2, PO2, HCO3, O2SAT in the last 72 hours. CRP:  No results for input(s): CRP in the last 72 hours. Sed Rate:  No results for input(s): SEDRATE in the last 72 hours. Cultures:   No results for input(s): CULTURE in the last 72 hours. No results for input(s): BC, Aundria Puri in the last 72 hours. No results for input(s): CXSURG in the last 72 hours. Radiology reports as per the Radiologist  Radiology: Xr Chest Portable    Result Date: 11/12/2020  Examination. XR CHEST PORTABLE 11/12/2020 11:49 AM History: Overdose. A frontal portable upright view of the chest is obtained. There is no previous study for comparison. The lungs are poorly inflated. There are areas of discoid atelectasis in the lower lung bilaterally. There is no evidence recent infiltrate, pleural effusion, pulmonary congestion or pneumothorax. Heart size is not evaluated. No acute bony abnormality. The poor lung expansion but no active cardiopulmonary disease. Signed by Dr Desire Arrieta on 11/12/2020 1:18 PM       Assessment     Drug overdose, intentional self-harm, initial encounter. Transfer out of ICU.   Ask psychiatry to evaluate.       Kb Kidd DO

## 2020-11-13 NOTE — PLAN OF CARE
Problem: Falls - Risk of:  Goal: Will remain free from falls  Description: Will remain free from falls  Outcome: Ongoing  Goal: Absence of physical injury  Description: Absence of physical injury  Outcome: Ongoing     Problem: Discharge Planning:  Goal: Discharged to appropriate level of care  Description: Discharged to appropriate level of care  Outcome: Ongoing     Problem: Fluid Volume - Deficit:  Goal: Absence of fluid volume deficit signs and symptoms  Description: Absence of fluid volume deficit signs and symptoms  Outcome: Ongoing     Problem: Nutrition Deficit:  Goal: Ability to achieve adequate nutritional intake will improve  Description: Ability to achieve adequate nutritional intake will improve  Outcome: Ongoing     Problem: Sleep Pattern Disturbance:  Goal: Appears well-rested  Description: Appears well-rested  Outcome: Ongoing     Problem: Violence - Risk of, Self/Other-Directed:  Goal: Knowledge of developmental care interventions  Description: Absence of violence  Outcome: Ongoing     Problem: Coping - Ineffective, Individual:  Goal: Ability to identify and develop effective coping behavior will improve  Description: Ability to identify and develop effective coping behavior will improve  Outcome: Ongoing     Problem: Health Maintenance - Impaired:  Goal: Ability to manage health-related needs will improve  Description: Ability to manage health-related needs will improve  Outcome: Ongoing     Problem: Skin Integrity:  Goal: Will show no infection signs and symptoms  Description: Will show no infection signs and symptoms  Outcome: Ongoing  Goal: Absence of new skin breakdown  Description: Absence of new skin breakdown  Outcome: Ongoing

## 2020-11-14 LAB
CHOLESTEROL, TOTAL: 180 MG/DL (ref 160–199)
HBA1C MFR BLD: 5.2 % (ref 4–6)
HDLC SERPL-MCNC: 47 MG/DL (ref 65–121)
LDL CHOLESTEROL CALCULATED: 112 MG/DL
TRIGL SERPL-MCNC: 107 MG/DL (ref 0–149)
TSH REFLEX FT4: 2.53 UIU/ML (ref 0.35–5.5)
VITAMIN B-12: 424 PG/ML (ref 211–946)
VITAMIN D 25-HYDROXY: 14.2 NG/ML

## 2020-11-14 PROCEDURE — 82306 VITAMIN D 25 HYDROXY: CPT

## 2020-11-14 PROCEDURE — 6370000000 HC RX 637 (ALT 250 FOR IP): Performed by: PSYCHIATRY & NEUROLOGY

## 2020-11-14 PROCEDURE — 83036 HEMOGLOBIN GLYCOSYLATED A1C: CPT

## 2020-11-14 PROCEDURE — 84443 ASSAY THYROID STIM HORMONE: CPT

## 2020-11-14 PROCEDURE — 99223 1ST HOSP IP/OBS HIGH 75: CPT | Performed by: PSYCHIATRY & NEUROLOGY

## 2020-11-14 PROCEDURE — 82607 VITAMIN B-12: CPT

## 2020-11-14 PROCEDURE — 1240000000 HC EMOTIONAL WELLNESS R&B

## 2020-11-14 PROCEDURE — 80061 LIPID PANEL: CPT

## 2020-11-14 PROCEDURE — 36415 COLL VENOUS BLD VENIPUNCTURE: CPT

## 2020-11-14 RX ORDER — BUPROPION HYDROCHLORIDE 150 MG/1
150 TABLET ORAL DAILY
Status: DISCONTINUED | OUTPATIENT
Start: 2020-11-14 | End: 2020-11-16 | Stop reason: HOSPADM

## 2020-11-14 RX ORDER — FLUVOXAMINE MALEATE 100 MG
100 TABLET ORAL NIGHTLY
Status: ON HOLD | COMMUNITY
End: 2020-11-16 | Stop reason: HOSPADM

## 2020-11-14 RX ORDER — ERGOCALCIFEROL 1.25 MG/1
50000 CAPSULE ORAL WEEKLY
Status: DISCONTINUED | OUTPATIENT
Start: 2020-11-14 | End: 2020-11-16 | Stop reason: HOSPADM

## 2020-11-14 RX ADMIN — BUPROPION HYDROCHLORIDE 150 MG: 150 TABLET, FILM COATED, EXTENDED RELEASE ORAL at 14:06

## 2020-11-14 RX ADMIN — TRAZODONE HYDROCHLORIDE 25 MG: 50 TABLET ORAL at 21:12

## 2020-11-14 RX ADMIN — ERGOCALCIFEROL 50000 UNITS: 1.25 CAPSULE ORAL at 21:12

## 2020-11-14 RX ADMIN — HYDROXYZINE HYDROCHLORIDE 25 MG: 25 TABLET, FILM COATED ORAL at 21:13

## 2020-11-14 RX ADMIN — HYDROXYZINE HYDROCHLORIDE 25 MG: 25 TABLET, FILM COATED ORAL at 14:06

## 2020-11-14 RX ADMIN — Medication 3 MG: at 21:11

## 2020-11-14 ASSESSMENT — SLEEP AND FATIGUE QUESTIONNAIRES
DO YOU USE A SLEEP AID: NO
DO YOU HAVE DIFFICULTY SLEEPING: NO

## 2020-11-14 ASSESSMENT — PAIN SCALES - GENERAL: PAINLEVEL_OUTOF10: 0

## 2020-11-14 NOTE — PROGRESS NOTES
[facility] Daily Shift Assessment  Nursing Progress Note    Room: 0619/619-01 Name: Linden Barrientos Age: 62 y.o. Gender: female   Dx: <principal problem not specified>  Precautions: suicide risk and fall risk  Target Symptoms:   Accu-Chek: NoSleep: Yes,Sleep Quality Good SI No AVH denies [facility]  ADLs: Yes Speech: normal Depression: 2 Anxiety: 2   Participation LevelActive Listener  Appetite: Good  Respiratory symptoms: No Headache: No Body aches: No Fever: No Cough: No  Patients encouraged to wear masks, wash hands frequently and practice social distancing while on the unit: Yes  Visitation: No   Participation QualityAppropriate    Notes: pt sitting in dining area dressed in casual clothing and groomed during interview. Pt states she slept \"pretty good\" last night. Pt states her mood is better and she is making plans for after discharge. Pt is compliant with medications, ADLs, groups and treatment plan. Pt denies SI,HI and AVH at this time and has no complaints. Will continue to monitor.     Signature: Eveline Pringle

## 2020-11-14 NOTE — GROUP NOTE
Group Therapy Note    Date: 11/14/2020    Group Start Time: 0830  Group End Time: 0930  Group Topic: Community Meeting    [facility] G[facility]IATR[facility] BEHAV[facility]AL UNIT    Fred Fournier RN        Group Therapy Note    Attendees:          Patient's Goal:  \"read and relax\"    Notes:  Pt calm and cooperative during group    Status After Intervention:  Improved    Participation Level:  Active Listener    Participation Quality: Appropriate      Speech:  normal      Thought Process/Content: Logical      Affective Functioning: Congruent      Mood: depressed      Level of consciousness:  Alert and Oriented x4      Response to Learning: Able to verbalize current knowledge/experience, Able to verbalize/acknowledge new learning, Able to retain information and Capable of insight      Endings: None Reported    Modes of Intervention: Education and Support      Discipline Responsible: Registered Nurse      Signature:  Emy Willams RN

## 2020-11-14 NOTE — PROGRESS NOTES
Requirement Note     SW met with pt to complete Psychosocial and CSSR-S on this date. Patients long and short term goals discussed. Pt voiced understanding. Treatment plan sheet signed. Pt verbalized understanding of the treatment plan. Pt participated in goals and objectives of the treatment plan. Pt completed safety plan with , pt received copy of plan, and original was placed into pt's chart. In the last 6 months has the pt been a danger to self: YES  In the last 6 months has the pt been a danger to others: NO  Legal Guardian/POA: NO     Provided pt with [employer] handout entitled \"Quitting Smoking. \"  Reviewed handout with pt addressing dangers of smoking, developing coping skills, and providing basic information about quitting. Patient received all components / Patient refused/declined practical counseling of tobacco practical counseling during the hospital stay.

## 2020-11-14 NOTE — PLAN OF CARE
improve  11/14/2020 0906 by James Granda RN  Outcome: Ongoing  11/13/2020 2133 by Shay Fontenot RN  Outcome: Ongoing     Problem: Safety:  Goal: Ability to contract for his/her safety will improve  Description: Ability to contract for his/her safety will improve  11/14/2020 0906 by James Granda RN  Outcome: Ongoing  11/13/2020 2133 by Shay Fontenot RN  Outcome: Ongoing

## 2020-11-14 NOTE — PROGRESS NOTES
Admission Note      Reason for admission/Target Symptom: Patient admitted to [facility] due to overdose and lethargy. Per report the patient took about 20 Ambien dated 2017 that were 10 mg tablets and she took them about 30 minutes prior to EMS arriving. Pt was also drinking wine when EMS arrived. Once pt was in the ED, she complained of a few stressors that she's been dealing with, including losing her job as a  and having to sell her house in [place] to move here and help her sister after she was in a MVA and their mother has Alzheimer's dementia and is doing poorly at this time. Pt has two teenage children that are back in [place] with pt ex- and pt said she has just been very overwhelmed and depressed recently. Pt denied AVH, HI, and was not delusional upon arrival to the ED. Diagnoses: Intentional drug overdose, initial encounter; Acute alcohol intoxication with complication; Suicide attempt    UDS: Negative  BAL: 176 @1239pm on 11/12/20    SW met with treatment team to discuss patient's treatment including care planning, discharge planning, and follow-up needs. Pt has been admitted to [facility]. Treatment team has identified patient's discharge needs as medication management and outpatient therapy/counseling. Pt confirmed  the need for ongoing treatment post inpatient stay. Pt was also provided a handout of contact information for drug and alcohol treatment centers and other community support service such as [facility], AA, and [facility].

## 2020-11-14 NOTE — H&P
Department of Psychiatry  Attending History and Physical        CHIEF COMPLAINT:  \"I'm better\"    History obtained from: patient, chart    HISTORY OF PRESENT ILLNESS:    28-year-old white female with history of depression, alcohol use, no previous suicide attempts or psychiatric hospitalizations, admitted to ICU s/p an overdose on Ambien and alcohol. UDS negative, . Patient was medically cleared and transferred to psychiatry for further management.     Patient was tearful when seen by our consult service yesterday. She admitted to a suicide attempt. She recently went through a number of stressors, including losing her job as a , having to sell her house in [place], coming to [place] to help her sister move. Patient's mother has history of Alzheimer's dementia and is doing poorly. Her sister had an MVA and is currently at the hospital.  Patient has 2 teenage children (patient had them in late 42's) who are currently with her ex- in [facility]. Patient has been overwhelmed and depressed. Sleeping poorly. She denied mood swings and racing thoughts. She denied auditory and visual hallucinations. She denied paranoia. Stated she has never had suicidal thoughts until recently. Patient is observed watching TV today. She presents with brighter affect and is smiling. He denies suicidal ideation and regrets her attempt. \"I need to be there for my children\". States she is \"feeling better. \"  Some anxiety related to the things she has to do at home when she leaves the hospital.  Slept well. Appetite is good. We discussed her treatment plan. Emphasized the importance of psychotherapy. Patient was receptive.   Patient is open to medication changes.     Psychiatric History:    Diagnoses: Depression - started after father    Suicide attempts/gestures: Denies   Prior hospitalizations: Denies   Medication trials: Prozac - caused numbness, Luvox - weight gain  Mental health contact: Lost to follow-up   Head trauma: Denies     Substance Use History:  Drinks alcohol - did not specify amount/frequesncy. Denies withdrawal seizures. Denies illicit drug use. Denies tobacco use. Past Medical History:    Past Medical History:   Diagnosis Date    Alcohol abuse     Asthma     Depression        Past Surgical History:    Past Surgical History:   Procedure Laterality Date    LEG SURGERY Left     tibia nailing       Medications Prior to Admission:   Prior to Admission medications    Medication Sig Start Date End Date Taking? Authorizing Provider   fluvoxaMINE (LUVOX) 100 MG tablet Take 100 mg by mouth nightly   Yes Historical Provider, MD   estrogen, conjugated,-medroxyPROGESTERone (PREMPRO) 0.3-1.5 MG per tablet Take 1 tablet by mouth nightly   Yes Historical Provider, MD   LORazepam (ATIVAN) 1 MG tablet Take 1 mg by mouth every 6 hours as needed for Anxiety. Historical Provider, MD   FLUoxetine (PROZAC) 10 MG tablet Take 10 mg by mouth nightly    Historical Provider, MD       Allergies:  Morphine and Pcn [penicillins]    Social History:      Family History:   Family History   Problem Relation Age of Onset    Alzheimer's Disease Mother      No history of psychiatric illness or suicide attempts. REVIEW OF SYSTEMS:  General: No fevers, chills, night sweats, no recent weight loss or gain. Head: No headache, no migraine. Eyes: No recent visual changes. Ears: No recent hearing changes. Nose: No increased congestion or change in sense of smell. Throat: No sore throat, no trouble swallowing. Cardiovascular: No chest pain or palpitations, or dizziness. Respiratory: No cough, wheezes, congestion, or shortness of breath. Gastrointestinal: No abdominal pain, nausea or vomiting, no diarrhea or constipation. Musculo-skeletal: No edema, deformities, or loss of functions. Neurological: No loss of consciousness, abnormal sensations, or weakness. Skin: No rash, abrasions or bruises.     PHYSICAL EXAM:  On observation  GENERAL APPEARANCE: Stated age, in NAD. HEAD: Normocephalic, atraumatic. CHEST: No deformities. MUSCULOSKELTAL: No obvious deformities, clubbing, cyanosis or edema. NEUROLOGICAL: Alert, oriented x 3, CN II-XII grossly intact. No abnormal movements or tremors. Gait stable. SKIN: Warm, dry, intact, no rash, abrasions, bruises. Vitals:  BP (!) 143/89   Pulse 77   Temp 98.5 °F (36.9 °C) (Temporal)   Resp 20   Ht 5' 3\" (1.6 m)   Wt 161 lb 2 oz (73.1 kg)   SpO2 93%   BMI 28.54 kg/m²     Mental Status Examination:    Appearance: Younger than stated age, hygiene improved, short hair, wears glasses  Behavior: Calm, cooperative, smiling  Speech: Normal in tone, volume, and quality. No slurring, dysarthria or pressured speech noted. Mood: \"Better\"   Affect: Mood congruent. Thought Process: Appears linear. Thought Content: Denies suicidal and homicidal ideation. No overt delusions or paranoia appreciated. Perceptions: Denies auditory or visual hallucinations at present time. Not responding to internal stimuli. Concentration: Intact. Orientation: to person, place, date, and situation. Language: Intact. Fund of information: Intact. Memory: Recent and remote appear intact. Neurovegitative: Improved appetite and sleep. Insight: Improving. Judgment: Improving.     DATA:  Lab Results   Component Value Date    WBC 5.1 11/13/2020    HGB 13.5 11/13/2020    HCT 39.1 11/13/2020    .0 (H) 11/13/2020     11/13/2020     Lab Results   Component Value Date     11/13/2020    K 3.7 11/13/2020     (H) 11/13/2020    CO2 23 11/13/2020    BUN 7 (L) 11/13/2020    CREATININE 0.5 11/13/2020    GLUCOSE 85 11/13/2020    CALCIUM 8.3 (L) 11/13/2020    PROT 6.0 (L) 11/13/2020    LABALBU 3.8 11/13/2020    BILITOT 0.7 11/13/2020    ALKPHOS 75 11/13/2020    AST 52 (H) 11/13/2020    ALT 34 (H) 11/13/2020    LABGLOM >60 11/13/2020    GFRAA >59 11/13/2020       ASSESSMENT AND PLAN:  DSM-5 DIAGNOSIS:   Impression:  Major depressive disorder, recurrent, severe, without psychotic features  Suicide attempt  Alcohol use disorder  Transaminitis    Patient endorses suicidal ideation and is meeting the criteria for inpatient psychiatric treatment. Plan:   -Admit to [facility] and monitor on 15 minute checks. Suicide and fall precautions.  Cline Snellen reviewed. -Gather collateral information from family with release.  -Medical monitoring to be performed by Dr. Bree New and associates. Routine labs. -Acclimate to the unit. Provide supportive psychotherapy.  -Encourage participation in groups and therapeutic activities as appropriate. Work on coping skills. -Medications:    Switch from sent Wellbutrin to help with depression. Discussed benefits, alternatives, and risks including but not limited to black box warning regarding increase in suicidality, worsening anxiety/depression, hypertension, tachycardia, headache, nausea/GI upset, agitation, dizziness, wt. loss, constipation/diarrhea, insomnia/fatigue, very rare risk of precipitation of wally in patients with bipolar disorder. Pt. states they have no seizure history, warned of risk of lowered seizure threshold, & thus, increased risk of seizures on medication. Discussed medication may take 6 to 8 weeks for full effect. Trazodone and melatonin for sleep. Discussed benefits, alternatives and risks involved with care, including - but not limited to - possible adverse effects of dizziness, hypotension, increased risk of falls w/ need to slowly transition between positions, excessive drowsiness, dry mouth, constipation or allergic reaction were discussed with the patient. Further discussed possibility of Serotonin Syndrome (sx including diaphoresis, agitation, muscle tension increase/rigidity, fever) with use of other serotonergic agents.  Advised caution in operating vehicles/machinery after taking trazodone if continued as an outpatient.     -The risks, benefits, side effects, indications, contraindications, and adverse effects of the medications have been discussed.  -The patient has verbalized understanding and has capacity to give informed consent.  -SW help evaluate home environment and provide outpatient resources.  -Discuss with treatment team.

## 2020-11-14 NOTE — PROGRESS NOTES
[facility] Daily Shift Assessment-Geriatric Unit  Nursing Progress Note          Room: [facility]619-01   Name: Antonieta Duarte   Age: 62 y.o. Gender: female   Dx: <principal problem not specified>  Precautions: suicide risk and fall risk  Inpatient Status: voluntary     SLEEP:    Sleep: Yes,   Sleep Quality Fair   Hours Slept: 6   Sleep Medications: Yes  PRN Sleep Meds: Yes       MEDICAL:      Other PRN Meds: Yes   Med Compliant: Yes   Accu-Chek: No   Oxygen/CPAP/BiPAP: No  CIWA/CINA: No   PAIN Assessment: none  Side Effects from medication: No      PSYCH:     SI denies suicidal ideation    HI Negative for homicidal ideation        AVH:Absent      Depression: 7 Anxiety: 10       GENERAL:      Appetite: decreased  Social: No Speech: normal   Appearance:appropriately dressed and healthy looking  Assistive Devices: noneLevel of Assist: Independent      GROUP:    Group Participation: No  Participation LevelNone    Participation Addie Saenz    Notes:   Patient resting in bed in her room at the beginning of the shift. Patient came out to the day area and made a call. Patient tearful and stated she was going back to her room and lay down. Patient has not reported and no signs or symptoms of covid noted. Patient wearing mask. Will continue to monitor for signs/symptoms of infection and monitor for safety.       Prabha Null RN

## 2020-11-15 PROCEDURE — 6370000000 HC RX 637 (ALT 250 FOR IP): Performed by: PSYCHIATRY & NEUROLOGY

## 2020-11-15 PROCEDURE — 1240000000 HC EMOTIONAL WELLNESS R&B

## 2020-11-15 RX ORDER — LIDOCAINE 4 G/G
1 PATCH TOPICAL DAILY
Status: DISCONTINUED | OUTPATIENT
Start: 2020-11-15 | End: 2020-11-16 | Stop reason: HOSPADM

## 2020-11-15 RX ADMIN — BUPROPION HYDROCHLORIDE 150 MG: 150 TABLET, FILM COATED, EXTENDED RELEASE ORAL at 08:23

## 2020-11-15 RX ADMIN — HYDROXYZINE HYDROCHLORIDE 25 MG: 25 TABLET, FILM COATED ORAL at 20:47

## 2020-11-15 RX ADMIN — HYDROXYZINE HYDROCHLORIDE 25 MG: 25 TABLET, FILM COATED ORAL at 08:23

## 2020-11-15 RX ADMIN — Medication 3 MG: at 20:47

## 2020-11-15 RX ADMIN — TRAZODONE HYDROCHLORIDE 25 MG: 50 TABLET ORAL at 20:48

## 2020-11-15 RX ADMIN — ACETAMINOPHEN 650 MG: 325 TABLET ORAL at 20:48

## 2020-11-15 ASSESSMENT — PAIN DESCRIPTION - ONSET
ONSET: ON-GOING
ONSET: ON-GOING

## 2020-11-15 ASSESSMENT — PAIN DESCRIPTION - PAIN TYPE
TYPE: CHRONIC PAIN
TYPE: CHRONIC PAIN

## 2020-11-15 ASSESSMENT — PAIN DESCRIPTION - DESCRIPTORS
DESCRIPTORS: ACHING;DISCOMFORT
DESCRIPTORS: ACHING;DISCOMFORT

## 2020-11-15 ASSESSMENT — PAIN - FUNCTIONAL ASSESSMENT
PAIN_FUNCTIONAL_ASSESSMENT: ACTIVITIES ARE NOT PREVENTED
PAIN_FUNCTIONAL_ASSESSMENT: ACTIVITIES ARE NOT PREVENTED

## 2020-11-15 ASSESSMENT — PAIN DESCRIPTION - ORIENTATION
ORIENTATION: LOWER;MID
ORIENTATION: LOWER;MID

## 2020-11-15 ASSESSMENT — PAIN DESCRIPTION - PROGRESSION
CLINICAL_PROGRESSION: GRADUALLY WORSENING
CLINICAL_PROGRESSION: GRADUALLY IMPROVING

## 2020-11-15 ASSESSMENT — PAIN SCALES - GENERAL
PAINLEVEL_OUTOF10: 2
PAINLEVEL_OUTOF10: 0
PAINLEVEL_OUTOF10: 4

## 2020-11-15 ASSESSMENT — PAIN DESCRIPTION - LOCATION
LOCATION: BACK
LOCATION: BACK

## 2020-11-15 ASSESSMENT — PAIN DESCRIPTION - FREQUENCY
FREQUENCY: CONTINUOUS
FREQUENCY: CONTINUOUS

## 2020-11-15 NOTE — PROGRESS NOTES
[facility] Daily Shift Assessment  Nursing Progress Note    Room: 0619/619-01 Name: Melisa Rangel Age: 62 y.o. Gender: female   Dx: <principal problem not specified>  Precautions: suicide risk  Target Symptoms:   Accu-Chek: NoSleep: Yes,Sleep Quality Good SI No AVH denies [facility]  ADLs: Yes Speech: normal Depression: 0 Anxiety: 0   Participation LevelActive Listener and Interactive  Appetite: Good  Respiratory symptoms: No Headache: No Body aches: No Fever: No Cough: No  Patients encouraged to wear masks, wash hands frequently and practice social distancing while on the unit: Yes  Visitation: No   Participation QualityAppropriate, Attentive, Sharing and Supportive    Notes: pt sitting in dining area dressed in casual clothing and groomed during interview. Pt states she slept well last night. Pt states her mood is better and she is making plans for after discharge. Pt is compliant with medications, ADLs, groups and treatment plan. Pt denies SI,HI and AVH at this time and has no complaints. Will continue to monitor. Signature:  RAMO Meredith

## 2020-11-15 NOTE — PLAN OF CARE
Problem: Health Maintenance - Impaired:  Goal: Ability to perform activities of daily living will improve  Description: Ability to perform activities of daily living will improve  11/15/2020 0923 by Dandre Blue RN  Outcome: Ongoing  11/15/2020 0024 by Bessie Chin RN  Outcome: Ongoing     Problem: Mood - Altered:  Goal: Mood stable  Description: Mood stable  11/15/2020 0923 by Dandre Blue RN  Outcome: Ongoing  11/15/2020 0024 by Bessie Chin RN  Outcome: Ongoing     Problem: Falls - Risk of:  Goal: Will remain free from falls  Description: Will remain free from falls  11/15/2020 0923 by Dandre Blue RN  Outcome: Ongoing  11/15/2020 0024 by Bessie Chin RN  Outcome: Ongoing     Problem: Pain:  Description: Pain management should include both nonpharmacologic and pharmacologic interventions.   Goal: Pain level will decrease  Description: Pain level will decrease  11/15/2020 0923 by Dandre Blue RN  Outcome: Ongoing  11/15/2020 0024 by Bessie Chin RN  Outcome: Ongoing  Goal: Control of acute pain  Description: Control of acute pain  11/15/2020 0923 by Dandre Blue RN  Outcome: Ongoing  11/15/2020 0024 by Bessie Chin RN  Outcome: Ongoing  Goal: Control of chronic pain  Description: Control of chronic pain  11/15/2020 0923 by Dandre Blue RN  Outcome: Ongoing  11/15/2020 0024 by Bessie Chin RN  Outcome: Ongoing     Problem: Health Behavior:  Goal: Ability to verbalize adaptive coping strategies to use when suicidal feelings occur will improve  Description: Ability to verbalize adaptive coping strategies to use when suicidal feelings occur will improve  11/15/2020 0923 by Dandre Blue RN  Outcome: Ongoing  11/15/2020 0024 by Bessie Chin RN  Outcome: Ongoing  Goal: Ability to verbalize adaptive coping strategies to use when the urge to self-mutilate occurs will improve  Description: Ability to verbalize adaptive coping strategies to use when the urge to self-mutilate occurs will improve  11/15/2020 0923 by Kamran Laureano RN  Outcome: Ongoing  11/15/2020 0024 by Kandice Moran RN  Outcome: Ongoing     Problem: Safety:  Goal: Ability to contract for his/her safety will improve  Description: Ability to contract for his/her safety will improve  11/15/2020 0923 by Kamran Laureano RN  Outcome: Ongoing  11/15/2020 0024 by Kandice Moran RN  Outcome: Ongoing

## 2020-11-15 NOTE — PLAN OF CARE
Problem: Health Maintenance - Impaired:  Goal: Ability to perform activities of daily living will improve  Description: Ability to perform activities of daily living will improve  Outcome: Ongoing     Problem: Mood - Altered:  Goal: Mood stable  Description: Mood stable  Outcome: Ongoing     Problem: Falls - Risk of:  Goal: Will remain free from falls  Description: Will remain free from falls  Outcome: Ongoing     Problem: Pain:  Goal: Pain level will decrease  Description: Pain level will decrease  Outcome: Ongoing  Goal: Control of acute pain  Description: Control of acute pain  Outcome: Ongoing  Goal: Control of chronic pain  Description: Control of chronic pain  Outcome: Ongoing     Problem: Health Behavior:  Goal: Ability to verbalize adaptive coping strategies to use when suicidal feelings occur will improve  Description: Ability to verbalize adaptive coping strategies to use when suicidal feelings occur will improve  Outcome: Ongoing  Goal: Ability to verbalize adaptive coping strategies to use when the urge to self-mutilate occurs will improve  Description: Ability to verbalize adaptive coping strategies to use when the urge to self-mutilate occurs will improve  Outcome: Ongoing     Problem: Safety:  Goal: Ability to contract for his/her safety will improve  Description: Ability to contract for his/her safety will improve  Outcome: Ongoing

## 2020-11-16 VITALS
DIASTOLIC BLOOD PRESSURE: 82 MMHG | SYSTOLIC BLOOD PRESSURE: 127 MMHG | HEART RATE: 70 BPM | HEIGHT: 63 IN | RESPIRATION RATE: 17 BRPM | WEIGHT: 161.13 LBS | OXYGEN SATURATION: 96 % | TEMPERATURE: 98 F | BODY MASS INDEX: 28.55 KG/M2

## 2020-11-16 PROBLEM — R45.851 DEPRESSION WITH SUICIDAL IDEATION: Status: ACTIVE | Noted: 2020-11-16

## 2020-11-16 PROBLEM — E55.9 VITAMIN D DEFICIENCY: Chronic | Status: ACTIVE | Noted: 2020-11-16

## 2020-11-16 PROBLEM — F32.A DEPRESSION WITH SUICIDAL IDEATION: Status: RESOLVED | Noted: 2020-11-16 | Resolved: 2020-11-16

## 2020-11-16 PROBLEM — F32.A DEPRESSION WITH SUICIDAL IDEATION: Status: ACTIVE | Noted: 2020-11-16

## 2020-11-16 PROBLEM — F33.2 MAJOR DEPRESSIVE DISORDER, RECURRENT SEVERE WITHOUT PSYCHOTIC FEATURES (HCC): Chronic | Status: ACTIVE | Noted: 2020-11-16

## 2020-11-16 PROBLEM — R45.851 DEPRESSION WITH SUICIDAL IDEATION: Status: RESOLVED | Noted: 2020-11-16 | Resolved: 2020-11-16

## 2020-11-16 PROCEDURE — 6370000000 HC RX 637 (ALT 250 FOR IP): Performed by: PSYCHIATRY & NEUROLOGY

## 2020-11-16 PROCEDURE — 99239 HOSP IP/OBS DSCHRG MGMT >30: CPT | Performed by: PSYCHIATRY & NEUROLOGY

## 2020-11-16 RX ORDER — ERGOCALCIFEROL 1.25 MG/1
50000 CAPSULE ORAL WEEKLY
Qty: 11 CAPSULE | Refills: 1 | Status: SHIPPED | OUTPATIENT
Start: 2020-11-21 | End: 2021-01-31

## 2020-11-16 RX ORDER — BUPROPION HYDROCHLORIDE 150 MG/1
150 TABLET ORAL DAILY
Qty: 30 TABLET | Refills: 1 | Status: SHIPPED | OUTPATIENT
Start: 2020-11-16 | End: 2020-12-16

## 2020-11-16 RX ORDER — LANOLIN ALCOHOL/MO/W.PET/CERES
3 CREAM (GRAM) TOPICAL NIGHTLY PRN
Qty: 30 TABLET | Refills: 1 | Status: SHIPPED | OUTPATIENT
Start: 2020-11-16 | End: 2021-01-04

## 2020-11-16 RX ORDER — HYDROXYZINE HYDROCHLORIDE 25 MG/1
25 TABLET, FILM COATED ORAL 3 TIMES DAILY PRN
Qty: 60 TABLET | Refills: 1 | Status: SHIPPED | OUTPATIENT
Start: 2020-11-16 | End: 2020-12-16

## 2020-11-16 RX ORDER — TRAZODONE HYDROCHLORIDE 50 MG/1
25 TABLET ORAL NIGHTLY PRN
Qty: 30 TABLET | Refills: 1 | Status: SHIPPED | OUTPATIENT
Start: 2020-11-16 | End: 2020-12-16

## 2020-11-16 RX ADMIN — HYDROXYZINE HYDROCHLORIDE 25 MG: 25 TABLET, FILM COATED ORAL at 09:21

## 2020-11-16 RX ADMIN — BUPROPION HYDROCHLORIDE 150 MG: 150 TABLET, FILM COATED, EXTENDED RELEASE ORAL at 08:20

## 2020-11-16 ASSESSMENT — PAIN SCALES - GENERAL: PAINLEVEL_OUTOF10: 0

## 2020-11-16 NOTE — PLAN OF CARE
Problem: Health Behavior:  Goal: Ability to verbalize adaptive coping strategies to use when suicidal feelings occur will improve  Description: Ability to verbalize adaptive coping strategies to use when suicidal feelings occur will improve  11/16/2020 1234 by Alexandra Stephens  Outcome: Ongoing  Note:                                                                     Group Therapy Note    Date: 11/16/2020  Start Time: 1000  End Time:  1130  Number of Participants: 5    Type of Group: Psychoeducation    Wellness Binder Information  Module Name:  Relapse Prevention  Session Number:  5    Group Goal for Pt: To improve knowledge of strategies to prevent relapse    Notes:  Pt demonstrated improved knowledge of strategies to prevent relapse by actively participating in group discussion. Status After Intervention:  Unchanged    Participation Level:  Active Listener and Interactive    Participation Quality: Appropriate and Attentive      Speech:  normal      Thought Process/Content: Logical      Affective Functioning: Congruent      Mood: anxious and depressed      Level of consciousness:  Alert and Oriented x4      Response to Learning: Able to verbalize current knowledge/experience, Able to verbalize/acknowledge new learning, and Progressing to goal      Endings: None Reported    Modes of Intervention: Education      Discipline Responsible: Psychoeducational Specialist      Signature:  Alexandra Stephens
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
## 2020-11-16 NOTE — PROGRESS NOTES
[facility]  Discharge Note  Bridge Appointment completed: Reviewed Discharge Instructions with patient. Patient verbalizes understanding and agreement with the discharge plan using the teachback method. Referral for Outpatient Tobacco Cessation Counseling, upon discharge (vinh X if applicable and completed):    ( )  Hospital staff assisted patient to call Quit Line or faxed referral                                   during hospitalization                  ( )  Recognizing danger situations (included triggers and roadblocks), if not completed on admission                    ( )  Coping skills (new ways to manage stress, exercise, relaxation techniques, changing routine, distraction), if not completed on admission                                                           ( )  Basic information about quitting (benefits of quitting, techniques in how to quit, available resources, if not completed on admission  ( ) [facility]   (x ) Patient refused referral  ( x) Patient refused counseling  (x ) Patient refused smoking cessation medication upon discharge    Vaccinations (vinh X if applicable and completed): (x ) Patient states already received influenza vaccine elsewhere  ( ) Patient received influenza vaccine during this hospitalization  ( ) Patient refused influenza vaccine at this time      Pt discharged with followings belongings:       Valuables sent home with Pt. Valuables retrieved from [facility] and returned to patient. Patient left department with Rn, discharged to home. Patient education on aftercare instructions: yes  Patient verbalize understanding of AVS:  yes. Suicidal Ideations? No AVH? Denies HI?  Negative for homicidal ideation       Status EXAM upon discharge:  Status and Exam  Normal: Yes  Facial Expression: Brightened  Affect: Appropriate  Level of Consciousness: Alert  Mood:Normal: Yes  Mood: Depressed, Anxious  Motor Activity:Normal: Yes  Motor Activity: Increased  Interview Behavior: Cooperative  Preception: [place] to Person, [place] to Time, [place] to Place, [place] to Situation  Attention:Normal: Yes  Attention: Distractible  Thought Processes: Circumstantial  Thought Content:Normal: Yes  Thought Content: Preoccupations  Hallucinations: None  Delusions: No  Memory:Normal: Yes  Memory: Poor Recent  Insight and Judgment: Yes  Insight and Judgment: Poor Insight  Present Suicidal Ideation: No  Present Homicidal Ideation: No

## 2020-11-16 NOTE — PLAN OF CARE
Problem: Health Maintenance - Impaired:  Goal: Ability to perform activities of daily living will improve  Description: Ability to perform activities of daily living will improve  11/15/2020 2303 by Sage Koehler RN  Outcome: Ongoing  11/15/2020 0923 by Ambrose Lee RN  Outcome: Ongoing     Problem: Mood - Altered:  Goal: Mood stable  Description: Mood stable  11/15/2020 2303 by Sage Koehler RN  Outcome: Ongoing  11/15/2020 0923 by Ambrose Lee RN  Outcome: Ongoing     Problem: Falls - Risk of:  Goal: Will remain free from falls  Description: Will remain free from falls  11/15/2020 2303 by Sage Koehler RN  Outcome: Ongoing  11/15/2020 0923 by Ambrose Lee RN  Outcome: Ongoing     Problem: Pain:  Goal: Pain level will decrease  Description: Pain level will decrease  11/15/2020 2303 by Sage Koehler RN  Outcome: Ongoing  11/15/2020 0923 by Ambrose Lee RN  Outcome: Ongoing  Goal: Control of acute pain  Description: Control of acute pain  11/15/2020 2303 by Sage Koehler RN  Outcome: Ongoing  11/15/2020 0923 by Ambrose Lee RN  Outcome: Ongoing  Goal: Control of chronic pain  Description: Control of chronic pain  11/15/2020 2303 by Sage Koehler RN  Outcome: Ongoing  11/15/2020 0923 by Ambrose Lee RN  Outcome: Ongoing     Problem: Health Behavior:  Goal: Ability to verbalize adaptive coping strategies to use when suicidal feelings occur will improve  Description: Ability to verbalize adaptive coping strategies to use when suicidal feelings occur will improve  11/15/2020 2303 by Sage Koehler RN  Outcome: Ongoing  11/15/2020 0923 by Ambrose Lee RN  Outcome: Ongoing  Goal: Ability to verbalize adaptive coping strategies to use when the urge to self-mutilate occurs will improve  Description: Ability to verbalize adaptive coping strategies to use when the urge to self-mutilate occurs will improve  11/15/2020 2303 by Sage Koehler RN  Outcome: Ongoing  11/15/2020 0923 by Asad Cano RN  Outcome: Ongoing     Problem: Safety:  Goal: Ability to contract for his/her safety will improve  Description: Ability to contract for his/her safety will improve  11/15/2020 2303 by Yeni Corrales RN  Outcome: Ongoing  11/15/2020 0923 by Asad Cano RN  Outcome: Ongoing

## 2020-11-16 NOTE — PROGRESS NOTES
Discharge Note    Pt discharging on this date. Pt denies SI, HI, and AVH at this time. Pt reports improvement in behavior and is leaving unit in overall good condition. Pt's follow up appointments and importance of attending appointments as scheduled were discussed, pt voiced understanding and agreement. Pt able to verbally identify: warning signs, coping strategies, places and people that help make the pt feel better/distract negative thoughts, friends/family/agencies/professionals the pt can reach out to in a crisis, and something that is important to the pt/worth living for. Pt provided the national suicide prevention hotline number (2-159-725-775-363-9885) as well as local community behavioral health [facility]) crisis number for emergencies (1-912.736.5636). Pt's sister reports that she and pt's friend will be coming to  pt upon discharge, pt made aware and to call when ready. Pt to follow up with providers in [place], as pt reports she will have those arranged and declined for appointments to be made on her behalf. Pt did want to see Dr. Noemy Goldman when she moves back to [facility] to stay with sister in January, appointment made for 1/20/21 at 2 PM with Dr. Noemy Goldman. Pt was also given clinic information for the open access/walk-in at [facility] to utilize if she does not go back to [place] as planned. Patient refused tobacco cessation counseling    Was pt or family talked to about guns/weapons removed if possible? Yes    Drug/alcohol use? Referral made or declined? Pt declined.

## 2020-11-16 NOTE — PROGRESS NOTES
[facility] Daily Shift Assessment-Geriatric Unit  Nursing Progress Note          Room: [facility]/619-01   Name: Matthew Boyd   Age: 62 y.o. Gender: female   Dx: <principal problem not specified>  Precautions: suicide risk and fall risk  Inpatient Status: voluntary     SLEEP:    Sleep: Yes,   Sleep Quality Good   Hours Slept:    Sleep Medications: Yes;Trazadone 25mg  PRN Sleep Meds: Yes; Melatonin 3mg       MEDICAL:      Other PRN Meds: Yes;Tylenol 650mg, Atarax 25mg   Med Compliant: Yes   Accu-Chek: No   Oxygen/CPAP/BiPAP: No  CIWA/CINA: No   PAIN Assessment: receiving treatment  Side Effects from medication: none voiced      PSYCH:     SI denies suicidal ideation    HI Negative for homicidal ideation        AVH:denies      Depression: denies Anxiety: denies       GENERAL:      Appetite: good  Social: Yes Speech: normal   Appearance:appropriately dressed and healthy looking  Assistive Devices: noneLevel of Assist: Independent      GROUP:    Group Participation: Yes  Participation LevelInteractive    Participation QualityAppropriate    Notes: Pt has been calm and cooperative tonight. She is social and has a bright affect. Pt has been encouraged to wear a mask and practice social distancing while on the milieu. Pt denies depression and anxiety and SI, HI, and AVH. Pt reports that she is ready to leave. Pt has been reading in her bed tonight. No s/s of respiratory illness noted. No cough, SOA or fever with VS. Monitoring for safety continues as ordered.          Electronically signed by Kermit Sandifer, RN on 11/15/20 at 11:08 PM CST

## 2020-11-16 NOTE — GROUP NOTE
Group Therapy Note    Date: 11/16/2020    Group Start Time: 0830  Group End Time: 0930  Group Topic: Community Meeting    [facility] 6 GERIATRIC BEHAVIORAL UNIT    Cecy Ibrahim RN        Group Therapy Note             Patient's Goal:  Discharge. Notes:  Pt calm and cooperative and participates appropriatly in group. Status After Intervention:  Unchanged    Participation Level:  Active Listener    Participation Quality: Appropriate      Speech:  normal      Thought Process/Content: Logical      Affective Functioning: Congruent      Mood: Happy      Level of consciousness:  Oriented x4      Response to Learning: Able to verbalize current knowledge/experience      Endings: None Reported    Modes of Intervention: Education      Discipline Responsible: Registered Nurse      Signature:  Cecy Ibrahim RN

## 2020-11-16 NOTE — DISCHARGE SUMMARY
Discharge Summary     Patient ID:  Kenia Allen  [id number]  11 y.o.  1960    Admit date: 11/13/2020  Discharge date: 11/16/2020    Admitting Physician: Shala Roth MD   Attending Physician: Shala Roth MD  Discharge Provider: Shala Roth MD     Admission Diagnoses:   Major depressive disorder, recurrent, severe, without psychotic features  Suicide attempt  Alcohol use disorder  Transaminitis    Discharge Diagnoses:   Major depressive disorder, recurrent, severe, without psychotic features  Alcohol use disorder  Transaminitis  Vitamin D deficiency    Admission Condition: poor    Discharged Condition: stable    Indication for Admission: Suicide attempt    CHIEF COMPLAINT:  \"I'm better\"     History obtained from: patient, chart     HISTORY OF PRESENT ILLNESS:    24-year-old white female with history of depression, alcohol use, no previous suicide attempts or psychiatric hospitalizations, admitted to ICU s/p an overdose on Ambien and alcohol.  UDS negative, .  Patient was medically cleared and transferred to psychiatry for further management.     Patient was tearful when seen by our consult service yesterday. She admitted to a suicide attempt.  She recently went through a number of stressors, including losing her job as a , having to sell her house in [place] [facility] [place], coming to [facility] to help her sister move.  Patient's mother has history of Alzheimer's dementia and is doing poorly. Maricarmen Wm sister had an MVA and is currently at the hospital.  Patient has 2 teenage children (patient had them in late 42's) who are currently with her ex- in [facility].  Patient has been overwhelmed and depressed.  Sleeping poorly.  She denied mood swings and racing thoughts.  She denied auditory and visual hallucinations.  She denied paranoia.  Stated she has never had suicidal thoughts until recently.      Patient is observed watching TV today. She presents with brighter affect and is smiling.   He denies suicidal ideation and regrets her attempt. \"I need to be there for my children\". States she is \"feeling better. \"  Some anxiety related to the things she has to do at home when she leaves the hospital.  Slept well. Appetite is good. We discussed her treatment plan. Emphasized the importance of psychotherapy. Patient was receptive. Patient is open to medication changes.     Psychiatric History:    Diagnoses: Depression - started after father    Suicide attempts/gestures: Denies   Prior hospitalizations: Denies   Medication trials: Prozac - caused numbness, Luvox - weight gain  Mental health contact: Lost to follow-up   Head trauma: Denies     Substance Use History:  Drinks alcohol - did not specify amount/frequesncy. Denies withdrawal seizures.  Denies illicit drug use.  Denies tobacco use. Hospital Course:  Patient was admitted to the behavioral health floor and was acclimated to the unit. She was placed on suicide precautions. Labs were reviewed and physical exam was completed by Dr. Jason Oshea and associates. Home medications were reconciled. FALLON was obtained and reviewed. Fluvoxamine was discontinued. Patient was started on Wellbutrin for depression. She received trazodone and melatonin for sleep, and Atarax as needed for episodic anxiety. Patient tolerated all her medications well, without side effects, and was compliant. She came with transaminitis. Follow-up with an outpatient primary care provider was recommended. Patient attended and participated in groups. All interactions with the peers and staff members were appropriate. Behaviorally, she was not a problem. There was no evidence of suicidality. Patient's affect significantly brightened. Sleep and appetite improved. With the above-mentioned medications changes as well as psychotherapeutic interventions, patient reported considerable improvement in her condition and requested discharge home. It was felt that patient was at her baseline. She was future-oriented and looking forward to going home to [place] to see her children. There were no safety concerns as per collateral from patient's sister. Patient has no access to guns at home. Patient was counseled on the harmful effects of alcohol on her physical and mental health. Sobriety was encouraged. Patient denied the need for outpatient chemical dependency treatment. On 11/16/2020 it was therefore decided to discharge the patient, as it was felt that she received maximal benefit from her hospitalization. This patient is not suicidal, homicidal or psychotic at discharge. She does not present danger to self or others.       Number of antipsychotic medication prescribed at discharge: 0  IF MORE THAN ONE IS USED: NA    History of greater than 3 failed multiple monotherapy trials: NA  Monotherapy taper plan/ cross taper in progress: NA  Augmentation of Clozapine: NA    Referral to addiction treatment: patient refused    Prescription for Alcohol or Drug Disorder Medication: patient refused    Prescription for Tobacco Cessation medication: none    If no prescriptions for Tobacco Cessation must document why: non-smoker    Consults: Internal medicine    Significant Diagnostic Studies:   Lab Results   Component Value Date    WBC 5.1 11/13/2020    HGB 13.5 11/13/2020    HCT 39.1 11/13/2020    .0 (H) 11/13/2020     11/13/2020     Lab Results   Component Value Date     11/13/2020    K 3.7 11/13/2020     (H) 11/13/2020    CO2 23 11/13/2020    BUN 7 (L) 11/13/2020    CREATININE 0.5 11/13/2020    GLUCOSE 85 11/13/2020    CALCIUM 8.3 (L) 11/13/2020    PROT 6.0 (L) 11/13/2020    LABALBU 3.8 11/13/2020    BILITOT 0.7 11/13/2020    ALKPHOS 75 11/13/2020    AST 52 (H) 11/13/2020    ALT 34 (H) 11/13/2020    LABGLOM >60 11/13/2020    GFRAA >59 11/13/2020         Lab Results   Component Value Date    ENOUWGKT85 424 11/14/2020     Lab Results   Component Value Date    VITD25 14.2 (L) 11/14/2020     Lab Results   Component Value Date    CHOL 180 11/14/2020     Lab Results   Component Value Date    TRIG 107 11/14/2020     Lab Results   Component Value Date    HDL 47 (L) 11/14/2020     Lab Results   Component Value Date    LDLCALC 112 11/14/2020     No results found for: LABVLDL, VLDL  No results found for: CHOLHDLRATIO  Lab Results   Component Value Date    LABA1C 5.2 11/14/2020     No results found for: EAG  Lab Results   Component Value Date    TSHFT4 2.53 11/14/2020       Treatments: RN and SW    Mental status examination at the time of discharge:  Alert, Oriented X 4  Appearance:  Improved Hygiene, Smiling  Speech with Regular Rate and Rhythm  Eye Contact:  Good  No Psychomotor Agitation/Retardation Noted  Attitude:  Cooperative  Mood:  \"Much better. So glad I'm going home! My children need me. \"  Affective: Congruent, appropriate to the situation, with a normal range and intensity  Thought Processes:  Coherently communicated, logical and goal oriented  Thought Content:  No Suicidal Ideation, No Homicidal Ideation, No Auditory or Visual Hallucinations, NO Overt Delusions  Insight: Improved  Judgement: Improved  Memory is intact for both remote and recent  Intellectual Functioning:  Within the [address] of Knowledge:  Adequate  Attention and Concentration:  Adequate    Discharge Exam:  Please, see medical note    Disposition: home    Patient Instructions:   Current Discharge Medication List      START taking these medications    Details   buPROPion (WELLBUTRIN XL) 150 MG extended release tablet Take 1 tablet by mouth daily  Qty: 30 tablet, Refills: 1      traZODone (DESYREL) 50 MG tablet Take 0.5 tablets by mouth nightly as needed for Sleep  Qty: 30 tablet, Refills: 1      vitamin D (ERGOCALCIFEROL) 1.25 MG (17660 UT) CAPS capsule Take 1 capsule by mouth once a week for 11 doses  Qty: 11 capsule, Refills: 1      melatonin 3 MG TABS tablet Take 1 tablet by mouth nightly as needed (sleep)  Qty: 30 tablet, Refills: 1      hydrOXYzine (ATARAX) 25 MG tablet Take 1 tablet by mouth 3 times daily as needed for Anxiety  Qty: 60 tablet, Refills: 1         CONTINUE these medications which have NOT CHANGED    Details   estrogen, conjugated,-medroxyPROGESTERone (PREMPRO) 0.3-1.5 MG per tablet Take 1 tablet by mouth nightly         STOP taking these medications       fluvoxaMINE (LUVOX) 100 MG tablet Comments:   Reason for Stopping:         LORazepam (ATIVAN) 1 MG tablet Comments:   Reason for Stopping:         FLUoxetine (PROZAC) 10 MG tablet Comments:   Reason for Stopping:               Activity: as tolerated  Diet: regular diet  Wound Care: none needed    Follow-up:  Follow up with Rekha Dorado 1 week(s)   As needed; Open Access/walk in clinic, please go Mon, Tue, Thurs &Fri between the hours of 8:00am to 10:00 am and 12:00pm to 2:30pm  [id number]   [facility] [address]   Phone: [phone]   Fax: 690 ju[address] manuel Calderon John Martinen: [phone]      [id number] New Patient Appointment with Marleny Trejo MD   Wednesday Jan 20, 2021 2:00 PM   Please arrive 15 minutes prior to scheduled appointment time to complete paper work, bring photo ID and insurance cards. [address] [facility]   [address] [place], [place] [address]juan manuel        Time worked: More than 31 minutes    Participation: good    Electronically signed by Marleny Trejo MD on 11/16/2020 at 12:39 PM

## 2020-11-16 NOTE — PROGRESS NOTES
Attempted to obtain collateral information prior to pt discharge from sister listed in chart; [place] at [phone], no answer, left voicemail with contact information.

## 2020-11-17 LAB
EKG P AXIS: 56 DEGREES
EKG P-R INTERVAL: 126 MS
EKG Q-T INTERVAL: 350 MS
EKG QRS DURATION: 72 MS
EKG QTC CALCULATION (BAZETT): 421 MS
EKG T AXIS: 38 DEGREES

## 2021-01-04 RX ORDER — LANOLIN ALCOHOL/MO/W.PET/CERES
CREAM (GRAM) TOPICAL
Qty: 30 TABLET | Refills: 1 | Status: SHIPPED | OUTPATIENT
Start: 2021-01-04

## 2021-01-04 NOTE — TELEPHONE ENCOUNTER
Pharmacy sent med refill request below. Last office visit : discharged from inpt on 11/16/2020 per Dr Fabio Samayoa. Next office visit : 1/20/2021 with Dr Fabio Samayoa.     Requested Prescriptions     Pending Prescriptions Disp Refills    melatonin 3 MG TABS tablet [Pharmacy Med Name: MELATONIN 3MG TABLETS] 30 tablet 1     Sig: TAKE 1 TABLET BY MOUTH NIGHTLY AS NEEDED FOR SLEEP            Jose Ramon Castellanos RN          Admission  Discharged     11/13/2020 - 11/16/2020 (3 days)  [facility]   Marissa Trejo MD  Last attending  Treatment team  Major depressive disorder, recurrent severe without psychotic features [facility]  Principal problem   Discharge Summary       Discharge Summary      Patient ID:  Fiorella Harry  [id number]  62 y.o.  1960     Admit date: 11/13/2020  Discharge date: 11/16/2020     Admitting Physician: Marissa Trejo MD   Attending Physician: Marissa Trejo MD  Discharge Provider: Marissa Trejo MD      Admission Diagnoses:   Major depressive disorder, recurrent, severe, without psychotic features  Suicide attempt  Alcohol use disorder  Transaminitis     Discharge Diagnoses:   Major depressive disorder, recurrent, severe, without psychotic features  Alcohol use disorder  Transaminitis  Vitamin D deficiency     Admission Condition: poor     Discharged Condition: stable     Indication for Admission: Suicide attempt     CHIEF COMPLAINT:  \"I'm better\"     History obtained from: patient, chart     HISTORY OF PRESENT ILLNESS:    73-year-old white female with history of depression, alcohol use, no previous suicide attempts or psychiatric hospitalizations, admitted to ICU s/p an overdose on Ambien and alcohol.  UDS negative, .  Patient was medically cleared and transferred to psychiatry for further management.     Patient was tearful when seen by our consult service yesterday. She admitted to a suicide attempt. Peyman Mayes recently went through a number of stressors, including losing her job as a , having to sell her house in [place] [facility] [place], coming to [facility] to help her sister move.  Patient's mother has history of Alzheimer's dementia and is doing poorly. Isael Pacheco sister had an MVA and is currently at the hospital.  Patient has 2 teenage children (patient had them in late 41's) who are currently with her ex- in [address] has been overwhelmed and depressed.  Sleeping poorly.  She denied mood swings and racing thoughts.  She denied auditory and visual hallucinations.  She denied paranoia.  Stated she has never had suicidal thoughts until recently.      Patient is observed watching TV today. Ronna Rudolph presents with brighter affect and is smiling.  He denies suicidal ideation and regrets her attempt.  \"I need to be there for my children\". States she is \"feeling better. \"  Some anxiety related to the things she has to do at home when she leaves the hospital.  Slept well.  Appetite is good.  We discussed her treatment plan.  Emphasized the importance of psychotherapy.  Patient was receptive. Jeff Hopkins is open to medication changes.     Psychiatric History:    Diagnoses: Depression - started after father    Suicide attempts/gestures: Denies   Prior hospitalizations: Denies   Medication trials: Prozac - caused numbness, Luvox - weight gain  Mental health contact: Lost to follow-up   Head trauma: Denies     Substance Use History:  Drinks alcohol - did not specify amount/frequesncy. Denies withdrawal seizures.  Denies illicit drug use.  Denies tobacco use.     Hospital Course:  Patient was admitted to the behavioral health floor and was acclimated to the unit. She was placed on suicide precautions. Labs were reviewed and physical exam was completed by Dr. Jeanne Zuñiga and associates. Home medications were reconciled. FALLON was obtained and reviewed. Fluvoxamine was discontinued. Patient was started on Wellbutrin for depression.   She received trazodone and melatonin for sleep, and Atarax as needed for episodic anxiety. Patient tolerated all her medications well, without side effects, and was compliant. She came with transaminitis. Follow-up with an outpatient primary care provider was recommended.     Patient attended and participated in groups. All interactions with the peers and staff members were appropriate. Behaviorally, she was not a problem.   There was no evidence of suicidality. Patient's affect significantly brightened. Sleep and appetite improved. With the above-mentioned medications changes as well as psychotherapeutic interventions, patient reported considerable improvement in her condition and requested discharge home. It was felt that patient was at her baseline. She was future-oriented and looking forward to going home to [place] to see her children. There were no safety concerns as per collateral from patient's sister. Patient has no access to guns at home. Patient was counseled on the harmful effects of alcohol on her physical and mental health. Sobriety was encouraged. Patient denied the need for outpatient chemical dependency treatment.     On 11/16/2020 it was therefore decided to discharge the patient, as it was felt that she received maximal benefit from her hospitalization. This patient is not suicidal, homicidal or psychotic at discharge.  She does not present danger to self or others.        Number of antipsychotic medication prescribed at discharge: 0  IF MORE THAN ONE IS USED: NA     History of greater than 3 failed multiple monotherapy trials: NA  Monotherapy taper plan/ cross taper in progress: NA  Augmentation of Clozapine: NA     Referral to addiction treatment: patient refused     Prescription for Alcohol or Drug Disorder Medication: patient refused     Prescription for Tobacco Cessation medication: none     If no prescriptions for Tobacco Cessation must document why: non-smoker     Consults: Internal medicine     Significant Diagnostic Studies:         Lab Results   Component Value Date     WBC 5.1 11/13/2020     HGB 13.5 11/13/2020     HCT 39.1 11/13/2020     .0 (H) 11/13/2020      11/13/2020            Lab Results   Component Value Date      11/13/2020     K 3.7 11/13/2020      (H) 11/13/2020     CO2 23 11/13/2020     BUN 7 (L) 11/13/2020     CREATININE 0.5 11/13/2020     GLUCOSE 85 11/13/2020     CALCIUM 8.3 (L) 11/13/2020     PROT 6.0 (L) 11/13/2020     LABALBU 3.8 11/13/2020     BILITOT 0.7 11/13/2020     ALKPHOS 75 11/13/2020     AST 52 (H) 11/13/2020     ALT 34 (H) 11/13/2020     LABGLOM >60 11/13/2020     GFRAA >59 11/13/2020                  Lab Results   Component Value Date     [id number] 424 11/14/2020            Lab Results   Component Value Date     VITD25 14.2 (L) 11/14/2020            Lab Results   Component Value Date     CHOL 180 11/14/2020            Lab Results   Component Value Date     TRIG 107 11/14/2020            Lab Results   Component Value Date     HDL 47 (L) 11/14/2020            Lab Results   Component Value Date     LDLCALC 112 11/14/2020      No results found for: LABVLDL, VLDL  No results found for: CHOLHDLRATIO        Lab Results   Component Value Date     LABA1C 5.2 11/14/2020      No results found for: EAG        Lab Results   Component Value Date     TSHFT4 2.53 11/14/2020         Treatments: RN and SW     Mental status examination at the time of discharge:  Alert, Oriented X 4  Appearance:  Improved Hygiene, Smiling  Speech with Regular Rate and Rhythm  Eye Contact:  Good  No Psychomotor Agitation/Retardation Noted  Attitude:  Cooperative  Mood:  \"Much better. So glad I'm going home! My children need me. \"  Affective: Congruent, appropriate to the situation, with a normal range and intensity  Thought Processes:  Coherently communicated, logical and goal oriented  Thought Content:  No Suicidal Ideation, No Homicidal Ideation, No Auditory or Visual Hallucinations, NO Overt Delusions  Insight: Improved  Judgement: Improved  Memory is intact for both remote and recent  Intellectual Functioning:  Within the [address] of Knowledge:  Adequate  Attention and Concentration:  Adequate     Discharge Exam:  Please, see medical note     Disposition: home     Patient Instructions:         Current Discharge Medication List             START taking these medications     Details   buPROPion (WELLBUTRIN XL) 150 MG extended release tablet Take 1 tablet by mouth daily  Qty: 30 tablet, Refills: 1       traZODone (DESYREL) 50 MG tablet Take 0.5 tablets by mouth nightly as needed for Sleep  Qty: 30 tablet, Refills: 1       vitamin D (ERGOCALCIFEROL) 1.25 MG (36052 UT) CAPS capsule Take 1 capsule by mouth once a week for 11 doses  Qty: 11 capsule, Refills: 1       melatonin 3 MG TABS tablet Take 1 tablet by mouth nightly as needed (sleep)  Qty: 30 tablet, Refills: 1       hydrOXYzine (ATARAX) 25 MG tablet Take 1 tablet by mouth 3 times daily as needed for Anxiety  Qty: 60 tablet, Refills: 1                 CONTINUE these medications which have NOT CHANGED     Details   estrogen, conjugated,-medroxyPROGESTERone (PREMPRO) 0.3-1.5 MG per tablet Take 1 tablet by mouth nightly                STOP taking these medications         fluvoxaMINE (LUVOX) 100 MG tablet Comments:   Reason for Stopping:            LORazepam (ATIVAN) 1 MG tablet Comments:   Reason for Stopping:            FLUoxetine (PROZAC) 10 MG tablet Comments:   Reason for Stopping:                    Activity: as tolerated  Diet: regular diet  Wound Care: none needed     Follow-up:  Follow up with Rekha Dorado 1 week(s)   As needed; Open Access/walk in clinic, please go Mon, Tue, Thurs &Fri between the hours of 8:00am to 10:00 am and 12:00pm to 2:30pm  [address]   [address] [place] [address]   Phone: [phone]   Fax: [phone]   [facility]: [phone]       [id number] New Patient Appointment with Naa Whitman

## 2024-09-22 NOTE — SUICIDE SAFETY PLAN
SAFETY PLAN    A suicide Safety Plan is a document that supports someone when they are having thoughts of suicide. Warning Signs that indicate a suicidal crisis may be developing: What (situations, thoughts, feelings, body sensations, behaviors, etc.) do you experience that lets you know you are beginning to think about suicide? 1. Living separate from children since Aug. 2.2017. 2. Hostile work environment. 3. Fear of  @ [employer]. Internal Coping Strategies:  What things can I do (relaxation techniques, hobbies, physical activities, etc.) to take my mind off my problems without contacting another person? 1. Read  2. [place] Dive. 3. Play with my dog. People and social settings that provide distraction: Who can I call or where can I go to distract me? 1. Name:  N/A kalee:  2. Name: N/A Phone:   3. Place: [facility]             4. Place: Jonothan Goldberg, [address] [place] [address] whom I can ask for help: Who can I call when I need help - for example, friends, family, clergy, someone else? 1. Name: Lexii Porras  Phone: [facility]  2. Name: Brittany Mccray Phone: [facility]  3. Name: No one else. Phone: N/A    Professionals or [address] [place] [address] I can contact during a crisis: Who can I call for help - for example, my doctor, my psychiatrist, my psychologist, a mental health provider, a suicide hotline? 1. Clinician Name: [facility]   Phone:       Clinician Pager or Emergency Contact #: [facility]    2. Clinician Name:    Phone:       Clinician Pager or Emergency Contact #:     3. Suicide Prevention Lifeline: 1-666-564-TALK (5696)    4. [address] [place] [address] Emergency Services -  for example, [facility], [facility] suicide hotline, [facility] Hotline: 211      Emergency Services Address: [address]. [facility]      Emergency Services Phone: [phone]    Making the environment safe: How can I make my environment (house/apartment/living space) safer?  For example, can I remove guns, medications, and other items? 1. No extra medications around.   2. Avoid alcohol [place]